# Patient Record
Sex: FEMALE | Race: OTHER | HISPANIC OR LATINO | Employment: UNEMPLOYED | URBAN - METROPOLITAN AREA
[De-identification: names, ages, dates, MRNs, and addresses within clinical notes are randomized per-mention and may not be internally consistent; named-entity substitution may affect disease eponyms.]

---

## 2023-05-31 ENCOUNTER — HOSPITAL ENCOUNTER (EMERGENCY)
Facility: HOSPITAL | Age: 28
Discharge: HOME | End: 2023-05-31
Attending: EMERGENCY MEDICINE

## 2023-05-31 VITALS
HEART RATE: 72 BPM | TEMPERATURE: 99 F | WEIGHT: 155.63 LBS | RESPIRATION RATE: 16 BRPM | DIASTOLIC BLOOD PRESSURE: 51 MMHG | SYSTOLIC BLOOD PRESSURE: 101 MMHG | OXYGEN SATURATION: 99 %

## 2023-05-31 DIAGNOSIS — O20.9 VAGINAL BLEEDING AFFECTING EARLY PREGNANCY: ICD-10-CM

## 2023-05-31 DIAGNOSIS — O36.80X0 PREGNANCY OF UNKNOWN ANATOMIC LOCATION: Primary | ICD-10-CM

## 2023-05-31 LAB
ANION GAP SERPL CALC-SCNC: 8 MMOL/L (ref 8–16)
B-HCG UR QL: POSITIVE
BASOPHILS # BLD AUTO: 0.02 K/UL (ref 0–0.2)
BASOPHILS NFR BLD: 0.3 % (ref 0–1.9)
BUN SERPL-MCNC: 10 MG/DL (ref 6–20)
CALCIUM SERPL-MCNC: 9.9 MG/DL (ref 8.7–10.5)
CHLORIDE SERPL-SCNC: 108 MMOL/L (ref 95–110)
CO2 SERPL-SCNC: 24 MMOL/L (ref 23–29)
CREAT SERPL-MCNC: 0.6 MG/DL (ref 0.5–1.4)
CTP QC/QA: YES
DIFFERENTIAL METHOD: NORMAL
EOSINOPHIL # BLD AUTO: 0.1 K/UL (ref 0–0.5)
EOSINOPHIL NFR BLD: 1 % (ref 0–8)
ERYTHROCYTE [DISTWIDTH] IN BLOOD BY AUTOMATED COUNT: 13 % (ref 11.5–14.5)
EST. GFR  (NO RACE VARIABLE): >60 ML/MIN/1.73 M^2
GLUCOSE SERPL-MCNC: 87 MG/DL (ref 70–110)
HCG INTACT+B SERPL-ACNC: 490 MIU/ML
HCT VFR BLD AUTO: 38 % (ref 37–48.5)
HCV AB SERPL QL IA: NORMAL
HGB BLD-MCNC: 12.5 G/DL (ref 12–16)
HIV 1+2 AB+HIV1 P24 AG SERPL QL IA: NORMAL
IMM GRANULOCYTES # BLD AUTO: 0.01 K/UL (ref 0–0.04)
IMM GRANULOCYTES NFR BLD AUTO: 0.2 % (ref 0–0.5)
LYMPHOCYTES # BLD AUTO: 2.5 K/UL (ref 1–4.8)
LYMPHOCYTES NFR BLD: 40.6 % (ref 18–48)
MCH RBC QN AUTO: 29.1 PG (ref 27–31)
MCHC RBC AUTO-ENTMCNC: 32.9 G/DL (ref 32–36)
MCV RBC AUTO: 88 FL (ref 82–98)
MONOCYTES # BLD AUTO: 0.5 K/UL (ref 0.3–1)
MONOCYTES NFR BLD: 8.5 % (ref 4–15)
NEUTROPHILS # BLD AUTO: 3.1 K/UL (ref 1.8–7.7)
NEUTROPHILS NFR BLD: 49.4 % (ref 38–73)
NRBC BLD-RTO: 0 /100 WBC
PLATELET # BLD AUTO: 240 K/UL (ref 150–450)
PMV BLD AUTO: 9.6 FL (ref 9.2–12.9)
POTASSIUM SERPL-SCNC: 3.9 MMOL/L (ref 3.5–5.1)
RBC # BLD AUTO: 4.3 M/UL (ref 4–5.4)
SODIUM SERPL-SCNC: 140 MMOL/L (ref 136–145)
WBC # BLD AUTO: 6.23 K/UL (ref 3.9–12.7)

## 2023-05-31 PROCEDURE — 81025 URINE PREGNANCY TEST: CPT | Performed by: PHYSICIAN ASSISTANT

## 2023-05-31 PROCEDURE — 99284 PR EMERGENCY DEPT VISIT,LEVEL IV: ICD-10-PCS | Mod: ,,, | Performed by: PHYSICIAN ASSISTANT

## 2023-05-31 PROCEDURE — 99284 EMERGENCY DEPT VISIT MOD MDM: CPT | Mod: ,,, | Performed by: PHYSICIAN ASSISTANT

## 2023-05-31 PROCEDURE — 86803 HEPATITIS C AB TEST: CPT | Performed by: PHYSICIAN ASSISTANT

## 2023-05-31 PROCEDURE — 87389 HIV-1 AG W/HIV-1&-2 AB AG IA: CPT | Performed by: PHYSICIAN ASSISTANT

## 2023-05-31 PROCEDURE — 80048 BASIC METABOLIC PNL TOTAL CA: CPT | Performed by: PHYSICIAN ASSISTANT

## 2023-05-31 PROCEDURE — 84702 CHORIONIC GONADOTROPIN TEST: CPT | Performed by: PHYSICIAN ASSISTANT

## 2023-05-31 PROCEDURE — 85025 COMPLETE CBC W/AUTO DIFF WBC: CPT | Performed by: PHYSICIAN ASSISTANT

## 2023-05-31 PROCEDURE — 99284 EMERGENCY DEPT VISIT MOD MDM: CPT | Mod: 25

## 2023-05-31 NOTE — ED TRIAGE NOTES
"Geovanni Sanjay Echavarria, a 28 y.o. female presents to the ED w/ complaint of possible pregnancy with spotting. LMP May 23 2023. Endorses lower abdm pain 5/10, "cramping like".     Triage note:  Chief Complaint   Patient presents with    Possible Pregnancy     Pt reports concern for possible pregancy. LMP May 23, 2023, states she took two pregnancy tests at home and they were positive, but now reports spotting.      Review of patient's allergies indicates:  No Known Allergies  No past medical history on file.    "

## 2023-05-31 NOTE — ED PROVIDER NOTES
Encounter Date: 5/31/2023       History     Chief Complaint   Patient presents with    Possible Pregnancy     Pt reports concern for possible pregancy. LMP May 23, 2023, states she took two pregnancy tests at home and they were positive, but now reports spotting.      28-year-old female from [place], German speaking only presents with vaginal bleeding and possible pregnancy.  No significant previous medical history, takes no prescription medications.  Has no known drug allergies.  Has 2 living children, both vaginal delivery.  Last menstrual cycle began on May 23rd and ended on May 26th.  Reports positive UPT at home.  Reports vaginal bleeding that began today with mild lower abdominal cramping.  No nausea, no vomiting.  No distress noted on exam.    Review of patient's allergies indicates:  No Known Allergies  History reviewed. No pertinent past medical history.  History reviewed. No pertinent surgical history.  History reviewed. No pertinent family history.  Social History     Tobacco Use    Smoking status: Never    Smokeless tobacco: Never   Substance Use Topics    Alcohol use: Not Currently    Drug use: Never     Review of Systems   Constitutional:  Negative for fever.   HENT:  Negative for sore throat.    Respiratory:  Negative for shortness of breath.    Cardiovascular:  Negative for chest pain.   Gastrointestinal:  Negative for nausea.   Genitourinary:  Positive for vaginal bleeding. Negative for dysuria.   Musculoskeletal:  Negative for back pain.   Skin:  Negative for rash.   Neurological:  Negative for weakness.   Hematological:  Does not bruise/bleed easily.     Physical Exam     Initial Vitals [05/31/23 0012]   BP Pulse Resp Temp SpO2   119/79 74 16 98.9 °F (37.2 °C) 100 %      MAP       --         Physical Exam    Constitutional: Vital signs are normal. She appears well-developed and well-nourished.   HENT:   Head: Normocephalic and atraumatic.   Right Ear: Hearing normal.   Left Ear: Hearing normal.    Eyes: Conjunctivae are normal.   Cardiovascular:  Normal rate and regular rhythm.           Abdominal: Abdomen is soft. Bowel sounds are normal.   Soft and benign abdomen   Musculoskeletal:         General: Normal range of motion.     Neurological: She is alert and oriented to person, place, and time.   Skin: Skin is warm and intact.   Psychiatric: She has a normal mood and affect. Her speech is normal and behavior is normal. Cognition and memory are normal.       ED Course   Procedures  Labs Reviewed   POCT URINE PREGNANCY - Abnormal; Notable for the following components:       Result Value    POC Preg Test, Ur Positive (*)     All other components within normal limits   HIV 1 / 2 ANTIBODY    Narrative:     Release to patient->Immediate   HEPATITIS C ANTIBODY    Narrative:     Release to patient->Immediate   CBC W/ AUTO DIFFERENTIAL    Narrative:     Release to patient->Immediate   HCG, QUANTITATIVE    Narrative:     Release to patient->Immediate   BASIC METABOLIC PANEL    Narrative:     Release to patient->Immediate          Imaging Results              US OB <14 Wks TransAbd & TransVag, Single Gestation (XPD) (Final result)  Result time 23 03:27:55   Procedure changed from US OB <14 Wks, TransAbd, Single Gestation     Final result by Sivakumar Baig MD (23 03:27:55)                   Impression:      No intrauterine pregnancy identified.  Thickened heterogeneous endometrium.  Findings consistent with pregnancy of unknown location and viability with differential including early pregnancy not yet visible, ectopic pregnancy and recent .  Clinical correlation, serial follow-up beta HCG and follow-up transvaginal ultrasound examination is recommended.    Retroverted uterus.    Prominent bilateral adnexal vessels, nonspecific.  Can be seen associated with pelvic congestion syndrome.    Electronically signed by resident: Arlene Perez  Date:    2023  Time:    03:13    Electronically  signed by: Sivakumar Baig MD  Date:    05/31/2023  Time:    03:27               Narrative:    EXAMINATION:  US OB <14 WEEKS, TRANSABDOM & TRANSVAG, SINGLE GESTATION (XPD)    CLINICAL HISTORY:  vaginal bleeding in early pregnancy; Hemorrhage in early pregnancy, unspecified    TECHNIQUE:  Transabdominal sonography of the pelvis was performed, followed by transvaginal sonography to better evaluate the uterus and ovaries.    COMPARISON:  None.    FINDINGS:  Intrauterine gestation(s): None    Mean gestational sac diameter: N/a    Yolk sac: N/a    Crown-rump length (CRL): N/a cm    Cardiac activity: None    Subchorionic hemorrhage: N/a    Uterus is retroverted and measures 7.2 x 3.7 x 5.3 cm.  Endometrium measures up to 16 mm.    Right ovary: Measures 2.7 x 2.2 x 1.7 cm with a possible corpus luteal cyst.    Left ovary: Measures 3.7 x 1.7 x 1.3 cm.  Unremarkable appearance.    Miscellaneous: No Free Fluid.  Prominent bilateral adnexal vessels.                                       Medications - No data to display  Medical Decision Making:   History:   Old Medical Records: I decided to obtain old medical records.  Old Records Summarized: records from clinic visits.  Initial Assessment:   28-year-old female with vaginal spotting and recent positive UPT  Differential Diagnosis:   Pregnancy of unknown viability or location, 1st trimester bleeding, implantation bleeding, miscarriage, menstrual cycle  Clinical Tests:   Lab Tests: Ordered and Reviewed  Radiological Study: Ordered and Reviewed  Medical Tests: Ordered and Reviewed  ED Management:  Plan:   Reassuring physical exam.  Beta hCG 400.  UPT positive.  Suspect this is likely very early pregnancy, possible implantation bleeding, ultrasound without any significant findings.  There was specifically no evidence of an ectopic pregnancy.  Though she is so early this is pregnancy of unknown location or viability.  She was advised to start taking prenatal pills and follow-up  with obstetrician, return precautions were given.                        Clinical Impression:   Final diagnoses:  [O20.9] Vaginal bleeding affecting early pregnancy  [O36.80X0] Pregnancy of unknown anatomic location (Primary)        ED Disposition Condition    Discharge Stable          ED Prescriptions    None       Follow-up Information    None          Michael Blakely PA-C  05/31/23 0334

## 2023-05-31 NOTE — DISCHARGE INSTRUCTIONS
Start taking pre  vitamins, followup with OB  Return to the ED as needed    Thank you for coming to our Emergency Department today. It is important to remember that some problems are difficult to diagnose and may not be found during your Emergency Department visit. Be sure to follow up with your primary care doctor and review all labs/imaging/tests that were performed during this visit with them. Some labs/tests may be outside of the normal range and require non-emergent follow-up and further investigation to help diagnose/exclude/prevent complications or other medical conditions.    If you do not have a primary care doctor, you may contact the one listed on your discharge paperwork or you may also call the [facility] Appointment Desk at [phone] to schedule an appointment and establish care with one. It is important to your health that you have a primary care doctor.    Please take all medications as directed. All medications may potentially have side-effects and it is impossible to predict which medications may give you side-effects or what side-effects (if any) they will give you.. If you feel that you are having a negative effect or side-effect of any medication you should immediately stop taking them and seek medical attention. If you feel that you are having a life-threatening reaction call 911.    Return to the ER with any questions/concerns, new/concerning symptoms, worsening or failure to improve.     Do not drive, swim, climb to height, take a bath or make any important decisions for 24 hours if you have received any pain medications, sedatives or mood altering drugs during your ER visit.

## 2025-05-14 ENCOUNTER — RESULTS FOLLOW-UP (OUTPATIENT)
Dept: OBSTETRICS AND GYNECOLOGY | Facility: CLINIC | Age: 30
End: 2025-05-14

## 2025-06-02 ENCOUNTER — RESULTS FOLLOW-UP (OUTPATIENT)
Dept: OBSTETRICS AND GYNECOLOGY | Facility: CLINIC | Age: 30
End: 2025-06-02

## 2025-06-24 ENCOUNTER — ROUTINE PRENATAL (OUTPATIENT)
Dept: OBSTETRICS AND GYNECOLOGY | Facility: CLINIC | Age: 30
End: 2025-06-24
Payer: MEDICAID

## 2025-06-24 ENCOUNTER — PROCEDURE VISIT (OUTPATIENT)
Dept: MATERNAL FETAL MEDICINE | Facility: CLINIC | Age: 30
End: 2025-06-24
Payer: MEDICAID

## 2025-06-24 ENCOUNTER — LAB VISIT (OUTPATIENT)
Dept: LAB | Facility: HOSPITAL | Age: 30
End: 2025-06-24
Attending: STUDENT IN AN ORGANIZED HEALTH CARE EDUCATION/TRAINING PROGRAM
Payer: MEDICAID

## 2025-06-24 VITALS
BODY MASS INDEX: 24.66 KG/M2 | SYSTOLIC BLOOD PRESSURE: 122 MMHG | HEART RATE: 73 BPM | DIASTOLIC BLOOD PRESSURE: 72 MMHG | WEIGHT: 152.75 LBS

## 2025-06-24 DIAGNOSIS — Z30.09 UNWANTED FERTILITY: Primary | ICD-10-CM

## 2025-06-24 DIAGNOSIS — Z34.83 ENCOUNTER FOR SUPERVISION OF OTHER NORMAL PREGNANCY IN THIRD TRIMESTER: ICD-10-CM

## 2025-06-24 LAB
ABSOLUTE EOSINOPHIL (OHS): 0.03 K/UL
ABSOLUTE MONOCYTE (OHS): 0.43 K/UL (ref 0.3–1)
ABSOLUTE NEUTROPHIL COUNT (OHS): 3.61 K/UL (ref 1.8–7.7)
BASOPHILS # BLD AUTO: 0.03 K/UL
BASOPHILS NFR BLD AUTO: 0.5 %
ERYTHROCYTE [DISTWIDTH] IN BLOOD BY AUTOMATED COUNT: 14.9 % (ref 11.5–14.5)
HCT VFR BLD AUTO: 36.7 % (ref 37–48.5)
HGB BLD-MCNC: 12 GM/DL (ref 12–16)
HIV 1+2 AB+HIV1 P24 AG SERPL QL IA: NORMAL
IMM GRANULOCYTES # BLD AUTO: 0.02 K/UL (ref 0–0.04)
IMM GRANULOCYTES NFR BLD AUTO: 0.3 % (ref 0–0.5)
LYMPHOCYTES # BLD AUTO: 1.92 K/UL (ref 1–4.8)
MCH RBC QN AUTO: 28.6 PG (ref 27–31)
MCHC RBC AUTO-ENTMCNC: 32.7 G/DL (ref 32–36)
MCV RBC AUTO: 87 FL (ref 82–98)
NUCLEATED RBC (/100WBC) (OHS): 0 /100 WBC
PLATELET # BLD AUTO: 198 K/UL (ref 150–450)
PMV BLD AUTO: 10.7 FL (ref 9.2–12.9)
RBC # BLD AUTO: 4.2 M/UL (ref 4–5.4)
RELATIVE EOSINOPHIL (OHS): 0.5 %
RELATIVE LYMPHOCYTE (OHS): 31.8 % (ref 18–48)
RELATIVE MONOCYTE (OHS): 7.1 % (ref 4–15)
RELATIVE NEUTROPHIL (OHS): 59.8 % (ref 38–73)
T PALLIDUM IGG+IGM SER QL: NORMAL
WBC # BLD AUTO: 6.04 K/UL (ref 3.9–12.7)

## 2025-06-24 PROCEDURE — 76816 OB US FOLLOW-UP PER FETUS: CPT | Mod: PBBFAC,PO | Performed by: STUDENT IN AN ORGANIZED HEALTH CARE EDUCATION/TRAINING PROGRAM

## 2025-06-24 PROCEDURE — 86593 SYPHILIS TEST NON-TREP QUANT: CPT

## 2025-06-24 PROCEDURE — 99999 PR PBB SHADOW E&M-EST. PATIENT-LVL II: CPT | Mod: PBBFAC,,, | Performed by: STUDENT IN AN ORGANIZED HEALTH CARE EDUCATION/TRAINING PROGRAM

## 2025-06-24 PROCEDURE — 85025 COMPLETE CBC W/AUTO DIFF WBC: CPT

## 2025-06-24 PROCEDURE — 87389 HIV-1 AG W/HIV-1&-2 AB AG IA: CPT

## 2025-06-24 PROCEDURE — 87081 CULTURE SCREEN ONLY: CPT | Performed by: STUDENT IN AN ORGANIZED HEALTH CARE EDUCATION/TRAINING PROGRAM

## 2025-06-24 PROCEDURE — 99212 OFFICE O/P EST SF 10 MIN: CPT | Mod: PBBFAC,TH,PO,25 | Performed by: STUDENT IN AN ORGANIZED HEALTH CARE EDUCATION/TRAINING PROGRAM

## 2025-06-24 PROCEDURE — 36415 COLL VENOUS BLD VENIPUNCTURE: CPT

## 2025-06-24 NOTE — PROGRESS NOTES
Chief Complaint   Patient presents with    Routine Prenatal Visit       30 y.o., at 36w4d by Estimated Date of Delivery: 25    Complaints today: Patient doing well. No signs of labor. No complaints.     ROS  OBSTETRICS:   Contractions: n   Bleeding: n   Loss of fluid: n   Fetal movement: y  GASTRO:   Nausea: n   Vomiting: n      OB History    Para Term  AB Living   4 3 3   2   SAB IAB Ectopic Multiple Live Births      0 3      # Outcome Date GA Lbr Lasha/2nd Weight Sex Type Anes PTL Lv   4 Current            3 Term 24 41w1d  3.21 kg (7 lb 1.2 oz) F Vag-Spont Spinal, EPI N NINA   2 Term 18 37w1d 07:29 / 02:10 2.572 kg (5 lb 10.7 oz) F Vag-Spont EPI N NINA      Complications: Cholestasis   1 Term      Vag-Spont   ND       Dating reviewed  Allergies and problem list reviewed and updated  Medical and surgical history reviewed  Prenatal labs reviewed and updated    PHYSICAL EXAM  /72   Pulse 73   Wt 69.3 kg (152 lb 12.5 oz)   LMP 10/01/2024   BMI 24.66 kg/m²     GENERAL: No acute distress  NEURO: Alert and oriented x3  PSYCH: Normal mood and affect  PULMONARY: Non-labored respiration  ABDomen: Soft, gravid, nontender    ASSESSMENT AND PLAN     Problems (from 25 to present)       Problem Noted Diagnosed Resolved    Encounter for supervision of normal pregnancy in third trimester 2025 by Kendal Conteh MD  No            GBS/3rds today  NML Growth today     labor precautions given  Follow-up: 1 week

## 2025-06-27 LAB — BACTERIA SPEC AEROBE CULT: NORMAL

## 2025-07-01 ENCOUNTER — ROUTINE PRENATAL (OUTPATIENT)
Dept: OBSTETRICS AND GYNECOLOGY | Facility: CLINIC | Age: 30
End: 2025-07-01
Payer: MEDICAID

## 2025-07-01 VITALS
BODY MASS INDEX: 24.91 KG/M2 | DIASTOLIC BLOOD PRESSURE: 55 MMHG | SYSTOLIC BLOOD PRESSURE: 113 MMHG | WEIGHT: 154.31 LBS | HEART RATE: 68 BPM

## 2025-07-01 DIAGNOSIS — Z30.09 UNWANTED FERTILITY: ICD-10-CM

## 2025-07-01 DIAGNOSIS — Z34.83 ENCOUNTER FOR SUPERVISION OF OTHER NORMAL PREGNANCY IN THIRD TRIMESTER: ICD-10-CM

## 2025-07-01 DIAGNOSIS — Z3A.37 37 WEEKS GESTATION OF PREGNANCY: Primary | ICD-10-CM

## 2025-07-01 LAB
BILIRUB SERPL-MCNC: ABNORMAL MG/DL
BLOOD URINE, POC: ABNORMAL
CLARITY, POC UA: CLEAR
COLOR, POC UA: YELLOW
GLUCOSE UR QL STRIP: ABNORMAL
KETONES UR QL STRIP: ABNORMAL
LEUKOCYTE ESTERASE URINE, POC: ABNORMAL
NITRITE, POC UA: ABNORMAL
PH, POC UA: 6.5
PROTEIN, POC: 100
SPECIFIC GRAVITY, POC UA: >=1.03
UROBILINOGEN, POC UA: 1

## 2025-07-01 PROCEDURE — 99999PBSHW POCT URINE DIPSTICK WITHOUT MICROSCOPE: Mod: PBBFAC,,,

## 2025-07-01 PROCEDURE — 99999 PR PBB SHADOW E&M-EST. PATIENT-LVL II: CPT | Mod: PBBFAC,,, | Performed by: STUDENT IN AN ORGANIZED HEALTH CARE EDUCATION/TRAINING PROGRAM

## 2025-07-01 PROCEDURE — 99214 OFFICE O/P EST MOD 30 MIN: CPT | Mod: TH,S$PBB,, | Performed by: STUDENT IN AN ORGANIZED HEALTH CARE EDUCATION/TRAINING PROGRAM

## 2025-07-01 PROCEDURE — 99212 OFFICE O/P EST SF 10 MIN: CPT | Mod: PBBFAC,TH,PO | Performed by: STUDENT IN AN ORGANIZED HEALTH CARE EDUCATION/TRAINING PROGRAM

## 2025-07-01 PROCEDURE — 81002 URINALYSIS NONAUTO W/O SCOPE: CPT | Mod: PBBFAC,PO | Performed by: STUDENT IN AN ORGANIZED HEALTH CARE EDUCATION/TRAINING PROGRAM

## 2025-07-01 RX ORDER — ONDANSETRON 4 MG/1
TABLET, ORALLY DISINTEGRATING ORAL
COMMUNITY

## 2025-07-01 RX ORDER — FAMOTIDINE 20 MG/1
1 TABLET, FILM COATED ORAL 2 TIMES DAILY
COMMUNITY

## 2025-07-01 NOTE — PROGRESS NOTES
Chief Complaint   Patient presents with    Routine Prenatal Visit       30 y.o., at 37w4d by Estimated Date of Delivery: 25    Complaints today: Patient doing well. No signs of labor.     ROS  OBSTETRICS:   Contractions: n   Bleeding: n   Loss of fluid: n   Fetal movement: y  GASTRO:   Nausea: n   Vomiting: n      OB History    Para Term  AB Living   4 3 3 0 0 2   SAB IAB Ectopic Multiple Live Births   0 0 0  3      # Outcome Date GA Lbr Lasha/2nd Weight Sex Type Anes PTL Lv   4 Current            3 Term 24 41w1d  3.21 kg (7 lb 1.2 oz) F Vag-Spont Spinal, EPI N NINA   2 Term 18 37w1d 07:29 / 02:10 2.572 kg (5 lb 10.7 oz) F Vag-Spont EPI N NINA      Complications: Cholestasis   1 Term      Vag-Spont   ND       Dating reviewed  Allergies and problem list reviewed and updated  Medical and surgical history reviewed  Prenatal labs reviewed and updated    PHYSICAL EXAM  BP (!) 113/55   Pulse 68   Wt 70 kg (154 lb 5.2 oz)   LMP 10/01/2024   BMI 24.91 kg/m²     GENERAL: No acute distress  NEURO: Alert and oriented x3  PSYCH: Normal mood and affect  PULMONARY: Non-labored respiration  ABDomen: Soft, gravid, nontender    ASSESSMENT AND PLAN     Problems (from 25 to present)       Problem Noted Diagnosed Resolved    Encounter for supervision of normal pregnancy in third trimester 2025 by Kendal Conteh MD  No            1/50/-4, Baby high  Expectant management    Labor precautions given  Follow-up: 1 week with Dr. Montoya

## 2025-07-08 ENCOUNTER — LAB VISIT (OUTPATIENT)
Dept: LAB | Facility: HOSPITAL | Age: 30
End: 2025-07-08
Attending: OBSTETRICS & GYNECOLOGY
Payer: MEDICAID

## 2025-07-08 ENCOUNTER — ROUTINE PRENATAL (OUTPATIENT)
Dept: OBSTETRICS AND GYNECOLOGY | Facility: CLINIC | Age: 30
End: 2025-07-08
Payer: MEDICAID

## 2025-07-08 VITALS
BODY MASS INDEX: 24.87 KG/M2 | WEIGHT: 154.13 LBS | DIASTOLIC BLOOD PRESSURE: 77 MMHG | SYSTOLIC BLOOD PRESSURE: 116 MMHG

## 2025-07-08 DIAGNOSIS — L29.9 ITCHING: ICD-10-CM

## 2025-07-08 DIAGNOSIS — Z3A.38 38 WEEKS GESTATION OF PREGNANCY: ICD-10-CM

## 2025-07-08 DIAGNOSIS — Z3A.38 38 WEEKS GESTATION OF PREGNANCY: Primary | ICD-10-CM

## 2025-07-08 LAB
ALBUMIN SERPL BCP-MCNC: 2.9 G/DL (ref 3.5–5.2)
ALP SERPL-CCNC: 245 UNIT/L (ref 40–150)
ALT SERPL W/O P-5'-P-CCNC: 92 UNIT/L (ref 10–44)
ANION GAP (OHS): 11 MMOL/L (ref 8–16)
AST SERPL-CCNC: 45 UNIT/L (ref 11–45)
BILIRUB SERPL-MCNC: 0.4 MG/DL (ref 0.1–1)
BILIRUB SERPL-MCNC: NORMAL MG/DL
BLOOD URINE, POC: NORMAL
BUN SERPL-MCNC: 7 MG/DL (ref 6–20)
CALCIUM SERPL-MCNC: 9.4 MG/DL (ref 8.7–10.5)
CHLORIDE SERPL-SCNC: 106 MMOL/L (ref 95–110)
CLARITY, POC UA: CLEAR
CO2 SERPL-SCNC: 18 MMOL/L (ref 23–29)
COLOR, POC UA: YELLOW
CREAT SERPL-MCNC: 0.7 MG/DL (ref 0.5–1.4)
GFR SERPLBLD CREATININE-BSD FMLA CKD-EPI: >60 ML/MIN/1.73/M2
GLUCOSE SERPL-MCNC: 65 MG/DL (ref 70–110)
GLUCOSE UR QL STRIP: NORMAL
KETONES UR QL STRIP: NORMAL
LEUKOCYTE ESTERASE URINE, POC: NORMAL
NITRITE, POC UA: NORMAL
PH, POC UA: 7
POTASSIUM SERPL-SCNC: 3.8 MMOL/L (ref 3.5–5.1)
PROT SERPL-MCNC: 7.3 GM/DL (ref 6–8.4)
PROTEIN, POC: 30
SODIUM SERPL-SCNC: 135 MMOL/L (ref 136–145)
SPECIFIC GRAVITY, POC UA: 1.02
UROBILINOGEN, POC UA: 1

## 2025-07-08 PROCEDURE — 99999 PR PBB SHADOW E&M-EST. PATIENT-LVL II: CPT | Mod: PBBFAC,,, | Performed by: OBSTETRICS & GYNECOLOGY

## 2025-07-08 PROCEDURE — 82040 ASSAY OF SERUM ALBUMIN: CPT

## 2025-07-08 PROCEDURE — 82239 BILE ACIDS TOTAL: CPT

## 2025-07-08 PROCEDURE — 81002 URINALYSIS NONAUTO W/O SCOPE: CPT | Mod: PBBFAC,PO | Performed by: OBSTETRICS & GYNECOLOGY

## 2025-07-08 PROCEDURE — 99212 OFFICE O/P EST SF 10 MIN: CPT | Mod: PBBFAC,PO | Performed by: OBSTETRICS & GYNECOLOGY

## 2025-07-08 PROCEDURE — 99999PBSHW POCT URINE DIPSTICK WITHOUT MICROSCOPE: Mod: PBBFAC,,,

## 2025-07-08 PROCEDURE — 36415 COLL VENOUS BLD VENIPUNCTURE: CPT

## 2025-07-08 NOTE — PROGRESS NOTES
31 yo  female @ 38.4 wks (EDC 2025) who presents for OB care. Dr. Conteh's patient.  Reports that she has been itching all over for a few days. Reports h/o cholestasis with previous pregnancy.  Reports great fetal movement. No lof. Min ctxs. No vb.  Cervix 3cm today, Vertex, Bile acids ordered, NST for later this week while bile acids results return.    F/u with dr. Conteh in 1 wk as scheduled.  Given labor precautions    JASON mac MD

## 2025-07-10 ENCOUNTER — HOSPITAL ENCOUNTER (OUTPATIENT)
Dept: OBSTETRICS AND GYNECOLOGY | Facility: HOSPITAL | Age: 30
Discharge: HOME | End: 2025-07-10
Attending: OBSTETRICS & GYNECOLOGY
Payer: MEDICAID

## 2025-07-10 VITALS
OXYGEN SATURATION: 98 % | DIASTOLIC BLOOD PRESSURE: 62 MMHG | HEART RATE: 76 BPM | RESPIRATION RATE: 16 BRPM | SYSTOLIC BLOOD PRESSURE: 114 MMHG

## 2025-07-10 DIAGNOSIS — O26.643 CHOLESTASIS DURING PREGNANCY IN THIRD TRIMESTER: ICD-10-CM

## 2025-07-10 DIAGNOSIS — K83.1 CHOLESTASIS: Primary | ICD-10-CM

## 2025-07-10 LAB — BILE AC SERPL-SCNC: 5 MCMOL/L

## 2025-07-10 PROCEDURE — 59025 FETAL NON-STRESS TEST: CPT

## 2025-07-15 ENCOUNTER — LAB VISIT (OUTPATIENT)
Dept: LAB | Facility: HOSPITAL | Age: 30
End: 2025-07-15
Attending: STUDENT IN AN ORGANIZED HEALTH CARE EDUCATION/TRAINING PROGRAM
Payer: MEDICAID

## 2025-07-15 ENCOUNTER — ROUTINE PRENATAL (OUTPATIENT)
Dept: OBSTETRICS AND GYNECOLOGY | Facility: CLINIC | Age: 30
End: 2025-07-15
Payer: MEDICAID

## 2025-07-15 VITALS
DIASTOLIC BLOOD PRESSURE: 76 MMHG | HEART RATE: 76 BPM | BODY MASS INDEX: 24.98 KG/M2 | WEIGHT: 154.75 LBS | SYSTOLIC BLOOD PRESSURE: 121 MMHG

## 2025-07-15 DIAGNOSIS — Z30.09 UNWANTED FERTILITY: Primary | ICD-10-CM

## 2025-07-15 DIAGNOSIS — Z34.83 ENCOUNTER FOR SUPERVISION OF OTHER NORMAL PREGNANCY IN THIRD TRIMESTER: ICD-10-CM

## 2025-07-15 LAB
ALBUMIN SERPL BCP-MCNC: 2.9 G/DL (ref 3.5–5.2)
ALP SERPL-CCNC: 252 UNIT/L (ref 40–150)
ALT SERPL W/O P-5'-P-CCNC: 58 UNIT/L (ref 10–44)
ANION GAP (OHS): 9 MMOL/L (ref 8–16)
AST SERPL-CCNC: 38 UNIT/L (ref 11–45)
BILIRUB SERPL-MCNC: 0.4 MG/DL (ref 0.1–1)
BUN SERPL-MCNC: 10 MG/DL (ref 6–20)
CALCIUM SERPL-MCNC: 9.5 MG/DL (ref 8.7–10.5)
CHLORIDE SERPL-SCNC: 106 MMOL/L (ref 95–110)
CO2 SERPL-SCNC: 19 MMOL/L (ref 23–29)
CREAT SERPL-MCNC: 0.7 MG/DL (ref 0.5–1.4)
GFR SERPLBLD CREATININE-BSD FMLA CKD-EPI: >60 ML/MIN/1.73/M2
GLUCOSE SERPL-MCNC: 68 MG/DL (ref 70–110)
POTASSIUM SERPL-SCNC: 4 MMOL/L (ref 3.5–5.1)
PROT SERPL-MCNC: 7.3 GM/DL (ref 6–8.4)
SODIUM SERPL-SCNC: 134 MMOL/L (ref 136–145)

## 2025-07-15 PROCEDURE — 82040 ASSAY OF SERUM ALBUMIN: CPT

## 2025-07-15 PROCEDURE — 99212 OFFICE O/P EST SF 10 MIN: CPT | Mod: PBBFAC,TH,PO | Performed by: STUDENT IN AN ORGANIZED HEALTH CARE EDUCATION/TRAINING PROGRAM

## 2025-07-15 PROCEDURE — 99999 PR PBB SHADOW E&M-EST. PATIENT-LVL II: CPT | Mod: PBBFAC,,, | Performed by: STUDENT IN AN ORGANIZED HEALTH CARE EDUCATION/TRAINING PROGRAM

## 2025-07-15 PROCEDURE — 82239 BILE ACIDS TOTAL: CPT

## 2025-07-15 PROCEDURE — 36415 COLL VENOUS BLD VENIPUNCTURE: CPT

## 2025-07-15 NOTE — PROGRESS NOTES
Chief Complaint   Patient presents with    Routine Prenatal Visit       30 y.o., at 39w4d by Estimated Date of Delivery: 25    Complaints today: Patient doing well. Feeling active fetal movements. Still having some generalized itching.     ROS  OBSTETRICS:   Contractions: n   Bleeding: n   Loss of fluid: n   Fetal movement: y  GASTRO:   Nausea: n   Vomiting: n      OB History    Para Term  AB Living   4 3 3 0 0 2   SAB IAB Ectopic Multiple Live Births   0 0 0  3      # Outcome Date GA Lbr Lasha/2nd Weight Sex Type Anes PTL Lv   4 Current            3 Term 24 41w1d  3.21 kg (7 lb 1.2 oz) F Vag-Spont Spinal, EPI N NINA   2 Term 18 37w1d 07:29 / 02:10 2.572 kg (5 lb 10.7 oz) F Vag-Spont EPI N NINA      Complications: Cholestasis   1 Term      Vag-Spont   ND       Dating reviewed  Allergies and problem list reviewed and updated  Medical and surgical history reviewed  Prenatal labs reviewed and updated    PHYSICAL EXAM  /76   Pulse 76   Wt 70.2 kg (154 lb 12.2 oz)   LMP 10/01/2024   BMI 24.98 kg/m²     GENERAL: No acute distress  NEURO: Alert and oriented x3  PSYCH: Normal mood and affect  PULMONARY: Non-labored respiration  ABDomen: Soft, gravid, nontender    ASSESSMENT AND PLAN     Problems (from 25 to present)       Problem Noted Diagnosed Resolved    Encounter for supervision of normal pregnancy in third trimester 2025 by Kendal Conteh MD  No            Will get Follow-Up Bile Acids and LFTS  Has IOL scheduled     labor precautions given  Follow-up: 1 week

## 2025-07-16 ENCOUNTER — ANESTHESIA (OUTPATIENT)
Dept: OBSTETRICS AND GYNECOLOGY | Facility: HOSPITAL | Age: 30
End: 2025-07-16
Payer: MEDICAID

## 2025-07-16 ENCOUNTER — ANESTHESIA EVENT (OUTPATIENT)
Dept: OBSTETRICS AND GYNECOLOGY | Facility: HOSPITAL | Age: 30
End: 2025-07-16
Payer: MEDICAID

## 2025-07-16 ENCOUNTER — HOSPITAL ENCOUNTER (INPATIENT)
Facility: HOSPITAL | Age: 30
LOS: 2 days | Discharge: HOME | End: 2025-07-18
Attending: STUDENT IN AN ORGANIZED HEALTH CARE EDUCATION/TRAINING PROGRAM | Admitting: OBSTETRICS & GYNECOLOGY
Payer: MEDICAID

## 2025-07-16 DIAGNOSIS — Z30.2 ENCOUNTER FOR TUBAL LIGATION: ICD-10-CM

## 2025-07-16 DIAGNOSIS — Z3A.39 39 WEEKS GESTATION OF PREGNANCY: ICD-10-CM

## 2025-07-16 LAB
ABSOLUTE EOSINOPHIL (OHS): 0.02 K/UL
ABSOLUTE MONOCYTE (OHS): 0.55 K/UL (ref 0.3–1)
ABSOLUTE NEUTROPHIL COUNT (OHS): 3.9 K/UL (ref 1.8–7.7)
ALBUMIN SERPL BCP-MCNC: 2.5 G/DL (ref 3.5–5.2)
ALP SERPL-CCNC: 214 UNIT/L (ref 40–150)
ALT SERPL W/O P-5'-P-CCNC: 48 UNIT/L (ref 10–44)
ANION GAP (OHS): 7 MMOL/L (ref 8–16)
AST SERPL-CCNC: 36 UNIT/L (ref 11–45)
BASOPHILS # BLD AUTO: 0.02 K/UL
BASOPHILS NFR BLD AUTO: 0.3 %
BILIRUB SERPL-MCNC: 0.2 MG/DL (ref 0.1–1)
BUN SERPL-MCNC: 10 MG/DL (ref 6–20)
CALCIUM SERPL-MCNC: 8.6 MG/DL (ref 8.7–10.5)
CHLORIDE SERPL-SCNC: 109 MMOL/L (ref 95–110)
CO2 SERPL-SCNC: 20 MMOL/L (ref 23–29)
CREAT SERPL-MCNC: 0.7 MG/DL (ref 0.5–1.4)
ERYTHROCYTE [DISTWIDTH] IN BLOOD BY AUTOMATED COUNT: 14.7 % (ref 11.5–14.5)
GFR SERPLBLD CREATININE-BSD FMLA CKD-EPI: >60 ML/MIN/1.73/M2
GLUCOSE SERPL-MCNC: 96 MG/DL (ref 70–110)
GROUP & RH: NORMAL
HCT VFR BLD AUTO: 31.8 % (ref 37–48.5)
HGB BLD-MCNC: 10.8 GM/DL (ref 12–16)
IMM GRANULOCYTES # BLD AUTO: 0.02 K/UL (ref 0–0.04)
IMM GRANULOCYTES NFR BLD AUTO: 0.3 % (ref 0–0.5)
INDIRECT COOMBS: NORMAL
LYMPHOCYTES # BLD AUTO: 2.41 K/UL (ref 1–4.8)
MCH RBC QN AUTO: 29.4 PG (ref 27–31)
MCHC RBC AUTO-ENTMCNC: 34 G/DL (ref 32–36)
MCV RBC AUTO: 87 FL (ref 82–98)
NUCLEATED RBC (/100WBC) (OHS): 0 /100 WBC
PLATELET # BLD AUTO: 161 K/UL (ref 150–450)
PMV BLD AUTO: 10.8 FL (ref 9.2–12.9)
POTASSIUM SERPL-SCNC: 4 MMOL/L (ref 3.5–5.1)
PROT SERPL-MCNC: 6.2 GM/DL (ref 6–8.4)
RBC # BLD AUTO: 3.67 M/UL (ref 4–5.4)
RELATIVE EOSINOPHIL (OHS): 0.3 %
RELATIVE LYMPHOCYTE (OHS): 34.8 % (ref 18–48)
RELATIVE MONOCYTE (OHS): 7.9 % (ref 4–15)
RELATIVE NEUTROPHIL (OHS): 56.4 % (ref 38–73)
SODIUM SERPL-SCNC: 136 MMOL/L (ref 136–145)
T PALLIDUM IGG+IGM SER QL: NORMAL
WBC # BLD AUTO: 6.92 K/UL (ref 3.9–12.7)

## 2025-07-16 PROCEDURE — 37000009 HC ANESTHESIA EA ADD 15 MINS: Performed by: STUDENT IN AN ORGANIZED HEALTH CARE EDUCATION/TRAINING PROGRAM

## 2025-07-16 PROCEDURE — 72100002 HC LABOR CARE, 1ST 8 HOURS

## 2025-07-16 PROCEDURE — 72200004 HC VAGINAL DELIVERY LEVEL I

## 2025-07-16 PROCEDURE — 86593 SYPHILIS TEST NON-TREP QUANT: CPT | Performed by: OBSTETRICS & GYNECOLOGY

## 2025-07-16 PROCEDURE — 11000001 HC ACUTE MED/SURG PRIVATE ROOM

## 2025-07-16 PROCEDURE — 27200710 HC EPIDURAL INFUSION PUMP SET: Performed by: ANESTHESIOLOGY

## 2025-07-16 PROCEDURE — 71000033 HC RECOVERY, INTIAL HOUR: Mod: SZN | Performed by: STUDENT IN AN ORGANIZED HEALTH CARE EDUCATION/TRAINING PROGRAM

## 2025-07-16 PROCEDURE — 25000003 PHARM REV CODE 250: Performed by: OBSTETRICS & GYNECOLOGY

## 2025-07-16 PROCEDURE — 62326 NJX INTERLAMINAR LMBR/SAC: CPT | Performed by: NURSE ANESTHETIST, CERTIFIED REGISTERED

## 2025-07-16 PROCEDURE — 37000008 HC ANESTHESIA 1ST 15 MINUTES: Performed by: STUDENT IN AN ORGANIZED HEALTH CARE EDUCATION/TRAINING PROGRAM

## 2025-07-16 PROCEDURE — 25000003 PHARM REV CODE 250: Performed by: NURSE ANESTHETIST, CERTIFIED REGISTERED

## 2025-07-16 PROCEDURE — 36004722: Performed by: STUDENT IN AN ORGANIZED HEALTH CARE EDUCATION/TRAINING PROGRAM

## 2025-07-16 PROCEDURE — 36004723: Performed by: STUDENT IN AN ORGANIZED HEALTH CARE EDUCATION/TRAINING PROGRAM

## 2025-07-16 PROCEDURE — 36415 COLL VENOUS BLD VENIPUNCTURE: CPT | Performed by: OBSTETRICS & GYNECOLOGY

## 2025-07-16 PROCEDURE — 85025 COMPLETE CBC W/AUTO DIFF WBC: CPT | Performed by: OBSTETRICS & GYNECOLOGY

## 2025-07-16 PROCEDURE — 0UB70ZZ EXCISION OF BILATERAL FALLOPIAN TUBES, OPEN APPROACH: ICD-10-PCS | Performed by: OBSTETRICS & GYNECOLOGY

## 2025-07-16 PROCEDURE — C1751 CATH, INF, PER/CENT/MIDLINE: HCPCS | Performed by: ANESTHESIOLOGY

## 2025-07-16 PROCEDURE — 59409 OBSTETRICAL CARE: CPT | Mod: GB,,, | Performed by: OBSTETRICS & GYNECOLOGY

## 2025-07-16 PROCEDURE — 63600175 PHARM REV CODE 636 W HCPCS: Performed by: NURSE ANESTHETIST, CERTIFIED REGISTERED

## 2025-07-16 PROCEDURE — 99900035 HC TECH TIME PER 15 MIN (STAT)

## 2025-07-16 PROCEDURE — 80053 COMPREHEN METABOLIC PANEL: CPT | Performed by: OBSTETRICS & GYNECOLOGY

## 2025-07-16 PROCEDURE — 58605 DIVISION OF FALLOPIAN TUBE: CPT | Mod: 51,,, | Performed by: STUDENT IN AN ORGANIZED HEALTH CARE EDUCATION/TRAINING PROGRAM

## 2025-07-16 PROCEDURE — 51702 INSERT TEMP BLADDER CATH: CPT

## 2025-07-16 PROCEDURE — 86901 BLOOD TYPING SEROLOGIC RH(D): CPT | Performed by: OBSTETRICS & GYNECOLOGY

## 2025-07-16 PROCEDURE — 63600175 PHARM REV CODE 636 W HCPCS: Performed by: OBSTETRICS & GYNECOLOGY

## 2025-07-16 RX ORDER — ONDANSETRON 8 MG/1
8 TABLET, ORALLY DISINTEGRATING ORAL EVERY 8 HOURS PRN
Status: DISCONTINUED | OUTPATIENT
Start: 2025-07-16 | End: 2025-07-18 | Stop reason: HOSPADM

## 2025-07-16 RX ORDER — OXYTOCIN-SODIUM CHLORIDE 0.9% IV SOLN 30 UNIT/500ML 30-0.9/5 UT/ML-%
95 SOLUTION INTRAVENOUS CONTINUOUS PRN
Status: DISCONTINUED | OUTPATIENT
Start: 2025-07-16 | End: 2025-07-18 | Stop reason: HOSPADM

## 2025-07-16 RX ORDER — ACETAMINOPHEN 500 MG
1000 TABLET ORAL EVERY 6 HOURS PRN
Status: DISCONTINUED | OUTPATIENT
Start: 2025-07-16 | End: 2025-07-18 | Stop reason: HOSPADM

## 2025-07-16 RX ORDER — CARBOPROST TROMETHAMINE 250 UG/ML
INJECTION, SOLUTION INTRAMUSCULAR
Status: DISCONTINUED
Start: 2025-07-16 | End: 2025-07-16 | Stop reason: WASHOUT

## 2025-07-16 RX ORDER — OXYTOCIN-SODIUM CHLORIDE 0.9% IV SOLN 30 UNIT/500ML 30-0.9/5 UT/ML-%
10 SOLUTION INTRAVENOUS ONCE AS NEEDED
Status: DISCONTINUED | OUTPATIENT
Start: 2025-07-16 | End: 2025-07-16

## 2025-07-16 RX ORDER — FENTANYL/BUPIVACAINE/NS/PF 2MCG/ML-.1
PLASTIC BAG, INJECTION (ML) INJECTION
Status: COMPLETED
Start: 2025-07-16 | End: 2025-07-16

## 2025-07-16 RX ORDER — IBUPROFEN 600 MG/1
600 TABLET, FILM COATED ORAL EVERY 6 HOURS
Status: DISCONTINUED | OUTPATIENT
Start: 2025-07-16 | End: 2025-07-18 | Stop reason: HOSPADM

## 2025-07-16 RX ORDER — DIPHENOXYLATE HYDROCHLORIDE AND ATROPINE SULFATE 2.5; .025 MG/1; MG/1
2 TABLET ORAL EVERY 6 HOURS PRN
Status: DISCONTINUED | OUTPATIENT
Start: 2025-07-16 | End: 2025-07-16

## 2025-07-16 RX ORDER — CARBOPROST TROMETHAMINE 250 UG/ML
250 INJECTION, SOLUTION INTRAMUSCULAR
Status: DISCONTINUED | OUTPATIENT
Start: 2025-07-16 | End: 2025-07-16

## 2025-07-16 RX ORDER — OXYTOCIN 10 [USP'U]/ML
10 INJECTION, SOLUTION INTRAMUSCULAR; INTRAVENOUS ONCE AS NEEDED
Status: DISCONTINUED | OUTPATIENT
Start: 2025-07-16 | End: 2025-07-18 | Stop reason: HOSPADM

## 2025-07-16 RX ORDER — OXYCODONE AND ACETAMINOPHEN 5; 325 MG/1; MG/1
1 TABLET ORAL EVERY 4 HOURS PRN
Refills: 0 | Status: DISCONTINUED | OUTPATIENT
Start: 2025-07-16 | End: 2025-07-18 | Stop reason: HOSPADM

## 2025-07-16 RX ORDER — ONDANSETRON 8 MG/1
8 TABLET, ORALLY DISINTEGRATING ORAL EVERY 8 HOURS PRN
Status: DISCONTINUED | OUTPATIENT
Start: 2025-07-16 | End: 2025-07-16

## 2025-07-16 RX ORDER — SODIUM CHLORIDE, SODIUM LACTATE, POTASSIUM CHLORIDE, CALCIUM CHLORIDE 600; 310; 30; 20 MG/100ML; MG/100ML; MG/100ML; MG/100ML
INJECTION, SOLUTION INTRAVENOUS CONTINUOUS
Status: DISCONTINUED | OUTPATIENT
Start: 2025-07-16 | End: 2025-07-16

## 2025-07-16 RX ORDER — PRENATAL WITH FERROUS FUM AND FOLIC ACID 3080; 920; 120; 400; 22; 1.84; 3; 20; 10; 1; 12; 200; 27; 25; 2 [IU]/1; [IU]/1; MG/1; [IU]/1; MG/1; MG/1; MG/1; MG/1; MG/1; MG/1; UG/1; MG/1; MG/1; MG/1; MG/1
1 TABLET ORAL DAILY
Status: DISCONTINUED | OUTPATIENT
Start: 2025-07-16 | End: 2025-07-18 | Stop reason: HOSPADM

## 2025-07-16 RX ORDER — OXYCODONE HYDROCHLORIDE 5 MG/1
5 TABLET ORAL EVERY 4 HOURS PRN
Status: DISCONTINUED | OUTPATIENT
Start: 2025-07-16 | End: 2025-07-16

## 2025-07-16 RX ORDER — OXYTOCIN 10 [USP'U]/ML
INJECTION, SOLUTION INTRAMUSCULAR; INTRAVENOUS
Status: DISPENSED
Start: 2025-07-16 | End: 2025-07-17

## 2025-07-16 RX ORDER — OXYTOCIN 10 [USP'U]/ML
10 INJECTION, SOLUTION INTRAMUSCULAR; INTRAVENOUS ONCE AS NEEDED
Status: DISCONTINUED | OUTPATIENT
Start: 2025-07-16 | End: 2025-07-16

## 2025-07-16 RX ORDER — CARBOPROST TROMETHAMINE 250 UG/ML
250 INJECTION, SOLUTION INTRAMUSCULAR
Status: DISCONTINUED | OUTPATIENT
Start: 2025-07-16 | End: 2025-07-18 | Stop reason: HOSPADM

## 2025-07-16 RX ORDER — OXYTOCIN-SODIUM CHLORIDE 0.9% IV SOLN 30 UNIT/500ML 30-0.9/5 UT/ML-%
95 SOLUTION INTRAVENOUS ONCE AS NEEDED
Status: DISCONTINUED | OUTPATIENT
Start: 2025-07-16 | End: 2025-07-16

## 2025-07-16 RX ORDER — MISOPROSTOL 200 UG/1
800 TABLET ORAL ONCE AS NEEDED
Status: DISCONTINUED | OUTPATIENT
Start: 2025-07-16 | End: 2025-07-18 | Stop reason: HOSPADM

## 2025-07-16 RX ORDER — METHYLERGONOVINE MALEATE 0.2 MG/ML
200 INJECTION INTRAVENOUS ONCE AS NEEDED
Status: DISCONTINUED | OUTPATIENT
Start: 2025-07-16 | End: 2025-07-18 | Stop reason: HOSPADM

## 2025-07-16 RX ORDER — DIPHENHYDRAMINE HCL 25 MG
25 CAPSULE ORAL EVERY 4 HOURS PRN
Status: DISCONTINUED | OUTPATIENT
Start: 2025-07-16 | End: 2025-07-18 | Stop reason: HOSPADM

## 2025-07-16 RX ORDER — MISOPROSTOL 200 UG/1
800 TABLET ORAL ONCE AS NEEDED
Status: DISCONTINUED | OUTPATIENT
Start: 2025-07-16 | End: 2025-07-16

## 2025-07-16 RX ORDER — OXYCODONE AND ACETAMINOPHEN 10; 325 MG/1; MG/1
1 TABLET ORAL EVERY 4 HOURS PRN
Refills: 0 | Status: DISCONTINUED | OUTPATIENT
Start: 2025-07-16 | End: 2025-07-18 | Stop reason: HOSPADM

## 2025-07-16 RX ORDER — SODIUM CHLORIDE 0.9 % (FLUSH) 0.9 %
10 SYRINGE (ML) INJECTION
Status: DISCONTINUED | OUTPATIENT
Start: 2025-07-16 | End: 2025-07-18 | Stop reason: HOSPADM

## 2025-07-16 RX ORDER — OXYTOCIN-SODIUM CHLORIDE 0.9% IV SOLN 30 UNIT/500ML 30-0.9/5 UT/ML-%
30 SOLUTION INTRAVENOUS ONCE AS NEEDED
Status: DISCONTINUED | OUTPATIENT
Start: 2025-07-16 | End: 2025-07-16

## 2025-07-16 RX ORDER — OXYTOCIN-SODIUM CHLORIDE 0.9% IV SOLN 30 UNIT/500ML 30-0.9/5 UT/ML-%
30 SOLUTION INTRAVENOUS ONCE AS NEEDED
Status: DISCONTINUED | OUTPATIENT
Start: 2025-07-16 | End: 2025-07-18 | Stop reason: HOSPADM

## 2025-07-16 RX ORDER — SODIUM CHLORIDE 9 MG/ML
INJECTION, SOLUTION INTRAVENOUS
Status: DISCONTINUED | OUTPATIENT
Start: 2025-07-16 | End: 2025-07-16

## 2025-07-16 RX ORDER — METHYLERGONOVINE MALEATE 0.2 MG/ML
200 INJECTION INTRAVENOUS ONCE AS NEEDED
Status: DISCONTINUED | OUTPATIENT
Start: 2025-07-16 | End: 2025-07-16

## 2025-07-16 RX ORDER — LIDOCAINE HYDROCHLORIDE 10 MG/ML
10 INJECTION, SOLUTION INFILTRATION; PERINEURAL ONCE AS NEEDED
Status: DISCONTINUED | OUTPATIENT
Start: 2025-07-16 | End: 2025-07-16

## 2025-07-16 RX ORDER — SIMETHICONE 80 MG
1 TABLET,CHEWABLE ORAL 4 TIMES DAILY PRN
Status: DISCONTINUED | OUTPATIENT
Start: 2025-07-16 | End: 2025-07-16

## 2025-07-16 RX ORDER — TRANEXAMIC ACID 10 MG/ML
INJECTION, SOLUTION INTRAVENOUS
Status: DISCONTINUED
Start: 2025-07-16 | End: 2025-07-16 | Stop reason: WASHOUT

## 2025-07-16 RX ORDER — METHYLERGONOVINE MALEATE 0.2 MG/ML
INJECTION INTRAVENOUS
Status: DISPENSED
Start: 2025-07-16 | End: 2025-07-17

## 2025-07-16 RX ORDER — DIPHENOXYLATE HYDROCHLORIDE AND ATROPINE SULFATE 2.5; .025 MG/1; MG/1
2 TABLET ORAL EVERY 6 HOURS PRN
Status: DISCONTINUED | OUTPATIENT
Start: 2025-07-16 | End: 2025-07-18 | Stop reason: HOSPADM

## 2025-07-16 RX ORDER — OXYTOCIN-SODIUM CHLORIDE 0.9% IV SOLN 30 UNIT/500ML 30-0.9/5 UT/ML-%
95 SOLUTION INTRAVENOUS ONCE AS NEEDED
Status: DISCONTINUED | OUTPATIENT
Start: 2025-07-16 | End: 2025-07-18 | Stop reason: HOSPADM

## 2025-07-16 RX ORDER — HYDROCORTISONE 25 MG/G
CREAM TOPICAL 3 TIMES DAILY PRN
Status: DISCONTINUED | OUTPATIENT
Start: 2025-07-16 | End: 2025-07-18 | Stop reason: HOSPADM

## 2025-07-16 RX ORDER — MUPIROCIN 20 MG/G
OINTMENT TOPICAL
Status: DISCONTINUED | OUTPATIENT
Start: 2025-07-16 | End: 2025-07-16

## 2025-07-16 RX ORDER — DIPHENHYDRAMINE HYDROCHLORIDE 50 MG/ML
25 INJECTION, SOLUTION INTRAMUSCULAR; INTRAVENOUS EVERY 4 HOURS PRN
Status: DISCONTINUED | OUTPATIENT
Start: 2025-07-16 | End: 2025-07-18 | Stop reason: HOSPADM

## 2025-07-16 RX ORDER — SIMETHICONE 80 MG
1 TABLET,CHEWABLE ORAL EVERY 6 HOURS PRN
Status: DISCONTINUED | OUTPATIENT
Start: 2025-07-16 | End: 2025-07-18 | Stop reason: HOSPADM

## 2025-07-16 RX ORDER — MISOPROSTOL 200 UG/1
TABLET ORAL
Status: DISCONTINUED
Start: 2025-07-16 | End: 2025-07-16 | Stop reason: WASHOUT

## 2025-07-16 RX ORDER — FENTANYL/BUPIVACAINE/NS/PF 2MCG/ML-.1
PLASTIC BAG, INJECTION (ML) INJECTION CONTINUOUS PRN
Status: DISCONTINUED | OUTPATIENT
Start: 2025-07-16 | End: 2025-07-16

## 2025-07-16 RX ORDER — CALCIUM CARBONATE 200(500)MG
500 TABLET,CHEWABLE ORAL 3 TIMES DAILY PRN
Status: DISCONTINUED | OUTPATIENT
Start: 2025-07-16 | End: 2025-07-16

## 2025-07-16 RX ORDER — OXYTOCIN-SODIUM CHLORIDE 0.9% IV SOLN 30 UNIT/500ML 30-0.9/5 UT/ML-%
95 SOLUTION INTRAVENOUS CONTINUOUS PRN
Status: DISCONTINUED | OUTPATIENT
Start: 2025-07-16 | End: 2025-07-16

## 2025-07-16 RX ORDER — DOCUSATE SODIUM 100 MG/1
200 CAPSULE, LIQUID FILLED ORAL 2 TIMES DAILY PRN
Status: DISCONTINUED | OUTPATIENT
Start: 2025-07-16 | End: 2025-07-18 | Stop reason: HOSPADM

## 2025-07-16 RX ADMIN — DOCUSATE SODIUM 200 MG: 100 CAPSULE, LIQUID FILLED ORAL at 08:07

## 2025-07-16 RX ADMIN — PRENATAL VIT W/ FE FUMARATE-FA TAB 27-0.8 MG 1 TABLET: 27-0.8 TAB at 08:07

## 2025-07-16 RX ADMIN — LIDOCAINE HYDROCHLORIDE AND EPINEPHRINE 3 ML: 15; 5 INJECTION, SOLUTION EPIDURAL at 03:07

## 2025-07-16 RX ADMIN — SODIUM CHLORIDE, POTASSIUM CHLORIDE, SODIUM LACTATE AND CALCIUM CHLORIDE 500 ML: 600; 310; 30; 20 INJECTION, SOLUTION INTRAVENOUS at 02:07

## 2025-07-16 RX ADMIN — OXYCODONE 5 MG: 5 TABLET ORAL at 08:07

## 2025-07-16 RX ADMIN — SIMETHICONE 80 MG: 80 TABLET, CHEWABLE ORAL at 11:07

## 2025-07-16 RX ADMIN — Medication 95 MILLI-UNITS/MIN: at 05:07

## 2025-07-16 RX ADMIN — OXYCODONE 5 MG: 5 TABLET ORAL at 03:07

## 2025-07-16 RX ADMIN — IBUPROFEN 600 MG: 600 TABLET ORAL at 11:07

## 2025-07-16 RX ADMIN — Medication 12 ML/HR: at 03:07

## 2025-07-16 RX ADMIN — IBUPROFEN 600 MG: 600 TABLET ORAL at 07:07

## 2025-07-16 RX ADMIN — IBUPROFEN 600 MG: 600 TABLET ORAL at 01:07

## 2025-07-16 NOTE — NURSING
PP vag delivery. VSS. Fundus firm, bleeding light. Pt ambulated to bathroom and voided. Clean pads and panties applied. Pt NPO except sips of water. Awaiting BTL at noon. Epidural cath in place and capped off per anesthesia request. No complaints at this time.

## 2025-07-16 NOTE — ANESTHESIA PROCEDURE NOTES
Epidural    Patient location during procedure: OB   Reason for block: primary anesthetic   Reason for block: labor analgesia requested by patient and obstetrician  Diagnosis: IUP   Start time: 7/16/2025 3:20 AM  Timeout: 7/16/2025 3:18 AM  End time: 7/16/2025 3:44 AM  Surgery related to: Planned vaginal delivery    Staffing  Performing Provider: Adolfo Beltre CRNA  Authorizing Provider: Daniele Simms MD    Staffing  Performed by: Adolfo Beltre CRNA  Authorized by: Daniele Simms MD        Preanesthetic Checklist  Completed: patient identified, IV checked, site marked, risks and benefits discussed, surgical consent, monitors and equipment checked, pre-op evaluation, timeout performed, anesthesia consent given, hand hygiene performed and patient being monitored  Preparation  Patient position: sitting  Prep: Betadine  Patient monitoring: Pulse Ox and Blood Pressure  Reason for block: primary anesthetic   Epidural  Skin Anesthetic: lidocaine 1%  Skin Wheal: 3 mL  Administration type: continuous  Approach: midline  Interspace: L3-4    Injection technique: PETER air  Needle and Epidural Catheter  Needle type: Tuohy   Needle gauge: 17  Needle length: 3.5 inches  Needle insertion depth: 4 cm  Catheter type: springwound and multi-orifice  Catheter size: 18 G  Catheter at skin depth: 9 cm  Insertion Attempts: 3 (Tight spaces. Had to move 1 level below from initial attempts)  Test dose: 3 mL of lidocaine 1.5% with Epi 1-to-200,000  Additional Documentation: incremental injection, negative aspiration for heme and CSF, no paresthesia on injection, no signs/symptoms of IV or SA injection, no significant pain on injection and no significant complaints from patient  Needle localization: anatomical landmarks  Medications:  Volume per aspiration: 0 mL  Time between aspirations: 2 minutes   Assessment  Upper dermatomal levels - Left: T10  Right: T10   Dermatomal levels determined by pinch or prick  Ease of block:  moderate  Patient's tolerance of the procedure: comfortable throughout block and no complaints No inadvertent dural puncture with Tuohy.  Dural puncture not performed with spinal needle

## 2025-07-16 NOTE — L&D DELIVERY NOTE
[facility] - Labor & Delivery  Vaginal Delivery   Operative Note    SUMMARY     Normal spontaneous vaginal delivery of live infant, was placed on mothers abdomen for skin to skin and bulb suctioning performed.  Infant delivered position OA over intact perineum.  Nuchal cord: No.    Spontaneous delivery of placenta and IV pitocin given noting good uterine tone.  No lacerations noted.  Patient tolerated delivery well. Sponge needle and lap counted correctly x2.    Indications:  (spontaneous vaginal delivery)  Pregnancy complicated by: Problem List[1]  Admitting GA: 39w5d    Delivery Information for Bita Guerra    Birth information:  YOB: 2025   Time of birth: 5:53 AM   Sex: female   Head Delivery Date/Time:     Delivery type:    Gestational Age: 39w5d       Delivery Providers    Delivering clinician: Marianne Castro MD   Provider Role    Marianne Castro MD Physician    Victor M Ignacio, [facility]    Elise Perry RN Registered Nurse    Marcelle Goldsmith RN Registered Nurse              Measurements    Weight:   Length:          Apgars    Living status: Living  Apgar Component Scores:  1 min.:  5 min.:  10 min.:  15 min.:  20 min.:    Skin color:         Heart rate:         Reflex irritability:         Muscle tone:         Respiratory effort:         Total:                  Operative Delivery    Forceps attempted?: No  Vacuum extractor attempted?: No         Shoulder Dystocia    Shoulder dystocia present?: No           Presentation    Presentation: Vertex  Position: Middle Occiput Anterior           Interventions/Resuscitation    Method: Bulb Suctioning, Tactile Stimulation       Cord    Vessels: 3 vessels  Complications: None  Delayed Cord Clamping?: Yes  Cord Blood Disposition: Lab  Gases Sent?: No  Stem Cell Collection (by MD): No       Placenta    Placenta delivery date/time:   Placenta removal: Spontaneous  Placenta appearance: Intact  Placenta disposition: Discarded            Labor Events:       labor: No     Labor Onset Date/Time:         Dilation Complete Date/Time:         Start Pushing Date/Time:         Start Pushing Date/Time:       Rupture Date/Time: 25 0525        Rupture type: SRM (Spontaneous Rupture)        Fluid Amount:       Fluid Color: Clear              steroids:       Antibiotics given for GBS: No     Induction: none     Indications for induction:        Augmentation:       Indications for augmentation:       Labor complications: None     Additional complications:          Cervical ripening:                     Delivery:      Episiotomy: None     Indication for Episiotomy:       Perineal Lacerations: None Repaired:      Periurethral Laceration:   Repaired:     Labial Laceration:   Repaired:     Sulcus Laceration:   Repaired:     Vaginal Laceration:   Repaired:     Cervical Laceration:   Repaired:     Repair suture: None     Repair # of packets: 0     Last Value - EBL - Nursing (mL):       Sum - EBL - Nursing (mL): 0     Last Value - EBL - Anesthesia (mL):      Calculated QBL (mL):       Running total QBL (mL):       Vaginal Sweep Performed: Yes     Surgicount Correct: Yes     Vaginal Packing: No Quantity:       Other providers:       Anesthesia    Method: Epidural          Details (if applicable):  Trial of Labor      Categorization:      Priority:     Indications for :     Incision Type:       Additional  information:  Forceps:    Vacuum:    Breech:    Observed anomalies    Other (Comments):              [1]   Patient Active Problem List  Diagnosis    Acute appendicitis with localized peritonitis, without perforation, abscess, or gangrene     (spontaneous vaginal delivery)    Encounter for supervision of normal pregnancy in third trimester    Unwanted fertility

## 2025-07-16 NOTE — ANESTHESIA PREPROCEDURE EVALUATION
07/16/2025  [facility] Micky Guerra is a 30 y.o., female.      Pre-op Assessment    I have reviewed the Patient Summary Reports.     I have reviewed the Nursing Notes. I have reviewed the NPO Status.   I have reviewed the Medications.     Review of Systems  Anesthesia Hx:  No problems with previous Anesthesia   History of prior surgery of interest to airway management or planning:          Denies Family Hx of Anesthesia complications.    Denies Personal Hx of Anesthesia complications.                    Social:  Non-Smoker, No Alcohol Use       Hematology/Oncology:  Hematology Normal   Oncology Normal                                   EENT/Dental:  EENT/Dental Normal           Cardiovascular:  Cardiovascular Normal                                              Pulmonary:  Pulmonary Normal                       Renal/:  Renal/ Normal                 Hepatic/GI:  Hepatic/GI Normal                    Musculoskeletal:  Musculoskeletal Normal                Neurological:  Neurology Normal                                      Endocrine:  Endocrine Normal            Dermatological:  Skin Normal    Psych:  Psychiatric Normal                    Physical Exam  General: Well nourished, Cooperative and Alert    Airway:  Mallampati: II / II  Mouth Opening: Normal  TM Distance: Normal  Tongue: Normal  Neck ROM: Normal ROM    Dental:  Intact        Anesthesia Plan  Type of Anesthesia, risks & benefits discussed:    Anesthesia Type: Epidural  Intra-op Monitoring Plan: Standard ASA Monitors  Post Op Pain Control Plan: epidural analgesia  Induction:  IV  Airway Plan: Direct  Informed Consent: Informed consent signed with the Patient and all parties understand the risks and agree with anesthesia plan.  All questions answered. Patient consented to blood products? Yes  ASA Score: 2  Day of Surgery Review of History &  Physical: H&P Update referred to the surgeon/provider.    Ready For Surgery From Anesthesia Perspective.     .

## 2025-07-16 NOTE — OP NOTE
OPERATIVE REPORT    Date of procedure: 07/16/2025    PREOPERATIVE DIAGNOSIS  1.   Unwanted Fertility    POSTOPERATIVE DIAGNOSIS  Unwanted Fertility  S/P postpartum tubal ligation    PROCEDURE:  1. Postpartum tubal ligation    SURGEON: Kendal Conteh MD    ASSISTANT: Marcos Duran    ANESTHESIA: General    COMPLICATIONS: None    EBL: Minimal > 5 cc    IV FLUIDS: See anesthesia record    URINE OUTPUT: See anesthesia record    FINDINGS:   Normal appearing bilateral fallopian tubes    PROCEDURE:   The patient was taken to the OR and prepped and draped in a sterile fashion. Her epidural was re-dosed and found to be adequate. The abdomen was entered theough a small transverse infraumbilical skin incision. The patient's left Fallopian tube was then identified, grasped with a Santosh clamp, and followed out to the fimbria. A Harbeson clamp was used to grasp the tube approximately 4 cm from the mid-isthmic portion. A 3 cm segment of tube was then ligated with two free ties of 0 chromic suture in a modified Landon technique and excised with Metzenbaum scissors. Tubal ostia were identified, good hemostasis was noted and the tube was returned to the abdomen. The right Fallopian tube was identified and grasped in the mid-isthmic region, doubly ligated and a 3 cm segment was excised in a similar fashion. Good hemostasis was noted and the tube was returned to the abdomen.     The peritoneum and fascia were then closed in a single layer using #1 PDS vicryl. The skin was closed in a subcuticular fashion using 4-0 monocryl. The patient tolerated the procedure well. Sponge, lap, and needle count were correct x2. The patient was taken to the recovery room in stable condition.      Kendal Conteh M.D.  OB/GYN  [facility]

## 2025-07-16 NOTE — DISCHARGE INSTRUCTIONS
Instrucciones de la lactancia maternal para los bebes recién nacidos:   La [employer] recomienda la leche maternal joe la unica Polly de nutrición para guzman bebé liz los primeros 6 meses de la dorie, y puede continuar, con la introducción de comida, hasta y despues de 1 ano de edad. Informado sobre coreas consejos: Hay muchos beneficios de amamantar para el barry de la madre y del bebé. Rosanne los chupones, teteras, o botellas por lo menos por las primeras 4 semanas. Usar los chupones, teteras, o botellas pueden interumpir el proceso de amamantar.   Alimenta en cuanto hay muestras de hambre:  Annabella en la boca, hacienda movimientos de succionar.  Ruiditos suavecitos o estirarse  Llorar es un signo de hambre tardío: no esperes a que el bebé llore.  Es de esperarse que haya 8-12 alimentaciones por 24 horas, aunque estas alimentaciones no sigan un horario definido.  Alterna el seno con el que empiezas, o empieza con el seno que se siente más lleno.  Cambia de lado cuando el bebé empiece a tragar más despacio o se desconecte del seno.  Esta christ si el bebé no come del philip seno en cada alimentación.  Si el bebé esta muy dormido o somnoliente: el contacto de piel a piel puede animarlo a empezar a comer:  Quítale la camiseta y acuéstalo sobre tu pecho desnudo  Duerme cerca de tu bebé, aun en la casa. Aprende a daniel pecho acostada.  En la posición correcta los dos estarán cómodos:  barbilla con seno, pecho con pecho  Labio del bebé abierto hacia afuera para tragar: el labio del bebé debe estar volteado hacia afuera  Boca abierta christ denisa: la boca del bebé cubre la mayoría de la areola (el área oscura del seno)--no nada más el pezón  Fíjate en los signos de que hay transferencia de leche:  Puedes oír al bebé tragar.  No se oyen ruidos más o menos billie joe clic.  El bebé no demuestra que tiene más hambre después de la alimentación.  El cuerpo del bebé y colin annabella están relajados por un tiempo  corto.  Lo que entra, tiene que salir. Está pendiente de:  Al cuarto día, por lo menos 3 cacas al día.  La brianda cambia de elmira a yoav o café y luego a amarillo más líquido cuando baja tu leche.  Después del cuarto día, por lo menos 6 pa?ales mojados/pesados al día.  La orina debe ser de color amarillo rodney cuando baja tu leche.  Debes cuco agua cuando tienes sed y comer alimentos sanos cuando tiene hambre. Rupali siesta para descansar lo suficiente.   No tomes medicamentos o alcohol sino con el consejo de guzman doctor. Puedes llamar al [facility] (Infant Risk Center): ([phone]), Lunes a Viernes, 8am-5pm, para obtener información sobre los medicamentos y la leche maternal.  La joel de seguimiento con la pediatra debe ser 2 días después de salir del hospital. El bebé deberia soraya ganado el peso de nacimiento cuando tiene 10-14 días de dorie.    Recursos comunitarios:    [facility] Línea directa de lactancia materna: [phone]    Centros hospitalarios de lactancia materna/Consultores de lactancia:  [facility] [phone]  [facility] [phone]  Consultas ambulatorias sobre lactancia de [facility] [phone]  [facility] [phone]    [facility] de Envenenamientos [phone] [email or web address].org  Asesoramiento médico gratuito las 24 horas del día, los 7 días de la semana a través de la Línea de ayuda contra intoxicaciones y la herramienta en línea    WIC (karishma hammer, gabrielle): [phone] ldh.la.gov/WIC  Un programa de nutrición a nivel estatal para mujeres embarazadas, lactantes y posparto, bebés y niños menores de 5 años. Proporciona información sobre alimentos y nutrición. También proporciona apoyo a la lactancia materna por parte de madres consejeras.    Sitio web del Dr. Rene Willard para vídeos sobre el cierre e información general:        www.[email or web address]  Infant Risk Center es un centro de llamadas que james información sobre la  seguridad de cuco medicamentos liz la lactancia.  Llame al [phone], de lunes a viernes, de 8 a.m. a 5 p.m., CT.  La Asociación Internacional de Consultores en Lactancia proporciona recursos de asistencia:        www.ilca.org  La Coalición de Lactancia Materna de isiana proporciona información y recursos para padres  y la comunidad [email or web address] Illastfeeding.org  Búsqueda por código postal de recursos sobre lactancia materna y más:                                                                              [email or web address]       Socios para bebés sanos: [phone])    Recursos del área de [facility]:   Servicios de [facility]: [email or web address]  Los centros de barry comunitarios están ubicados en Steven, Henrietta, Estela, Polo, Omar, Yesenia Morales, [place], [facility] y [facility]. Ofrece servicios pediátricos, servicios de barry para mujeres, servicios WIC y más.   Bebé Café [email or web address]  Grupos informales, gratuitos y sin joel previa de apoyo a la lactancia materna que ofrecen atención e intervención profesional en lactancia.  Yesenia GAMEZLA Baby Café se reúne los jueves en [facility] (ubicado en el salón Creole en el primer piso) de 1:00 p. m. a 3:00 p. m. Un traductor de español está disponible en esta reunión.   Birthmark Doulas/Centro de lactancia materna de [facility]: [email or web address]  Entrega de alimentación infantil en clínicas, servicios de lactancia, grupos de apoyo, programas educativos.   Café con leche: [phone] [email or web address]  Benedicto de apoyo gratuito a la lactancia materna para familias de color   Anidación de LUIS ALBERTO: [phone] [email or web address]  Apoyo presencial y virtual para familias liz el embarazo, el parto y la paternidad temprana.   [facility], [place] (Lucy Ojeda RN, IBCLC)  [phone]  [email or web address]  [email or web address] Island [email or web address]   Medicina Avanzada de Lactancia  Materna de [place]: Dra. Susan Victoria.  [address] [place] [address]  [email or web address]  [phone]  [email or web address]   Comienzo Saludable [place].  [phone] (Huong) [phone] (Carlos)  [email or web address]  Atiende a mujeres en edad fértil y aborda problemas de mujeres embarazadas y colin hijos desde el nacimiento hasta los dos años.   Liga [place]ec[facility] [place] de [place] Asif.Teravac/[employer]/SocialMadeSimpleluke  Grupos de apoyo madre a madre presenciales y virtuales con educación, información, apoyo y estímulo a las mujeres que florence amamantar.     Instrucciones Para Daniel de Inge    Instrucciones a Seguir    Dieta regular  Actividad: Aumentarntar gradualmente  Vanessa: Regadera o Apryl  Restricciones: No levantar nada pesado  Cuidado Personal: No tampones o duchas vaginales  Actividad Sexual: Rosanne relaciones sexuales  Planificacion Familiar: Consulta con guzman medico  Cuidado de los Senos: Use un sosten de soporte  Regresar al trabajo/escuela: Cuando guzman medico le indique    Cuando debe llamar al Doctor    Fiebre de 100.4 o mas alto  Nausea/Vomito persistente  Secrecion de la incision  Felice sangrado vaginal o coagulos  Inflamacion/dolor en los brazos o las piernas  Severo dolor de gabriella, vision borrosa or desmayos  Frequencia/ardor urinario  Senales de depresion post-parto        La Depresion Post-Parto    Usted acaba de tener un jenaro'.  A pesar de que sabe que deberia sentirse emocionada y mendes, lo que seinte son ganas de llorar sin motivo y tiene dificultades para realizar colin tareas diarias.  Usted pasa la mayor parte del tiempo reza, cansada y desesperanzada, y hasta podria sentirse avergonzada o culpable.  Nicol lo que le esta sucediendo no es culpa suya, y puede sentirse mejor.  Hable con guzman medico para que la ayude.     La Depresion Despues del Parto    Gary vez sienta cansancio y ganas de llorar tawnya despues de daniel a mahendra.   "Esta etapa melancolica, denominada "baby blues", puede hacerla sentir reza y desesperanzada, o temerosa de que algo dixie le vaya a pasar al jenaro.  Algunas mujeres hasta llegan a poner en jane el que puedan ser buenas madres.    Que' es la Depresion?    La depresion es un trastorno del animo que afecta guzman manera de pernsar y de sentir.  El sintoma mas frecuente es un sentimiento de honda tristeza: tambien podria causane la sensacion de que ya no puede sobrellevar la dorie.    Otros sintomas incluyen:      * Ganar o perder mucho peso  * Dormir en exceso o demasiado poco  * Estar cansada todo el tiempo  * Sentirse inquieta  * Tener miedo de querer herir al jenaro'  * No tener interes por el jenaro'  * Sentirse inutil o culpable   * No encontrar placer en las cosas que solia gustarle hacer  * Dificultades para pensar con claridad o cuco decisiones  * Pensar sobre la muerte o el suicidio     Que' Causa la Depresion Postparto?    La causea exacta de la depresion postarto se desconce, aunque posiblemente es el resultado de los cambios hormonales que suceden liz y despues del parto.  Tambien puede deberse al cansancio que le causan las exigencias del jenaro' y el proceso de adaptacion a guzman maternidad.  Todos estos factores podrian deprimirla.  En algunos casos, existe rayne predisposicion genetica a fidelia tipo de depresion.    La depresion puede tratarse    Lo munoz es que hay muchas maneras de tratar la depresion postparo.  Emprenda el primer paso para sentirse mejor hablando con guzman medico.     Recursos    * National Institutes of Mental Health-- [phone]     www.[facility].nih.gov    * National Marshall on Mental Illness -- [phone]     Www.allegra.org    * Mental Health Maria Fernanda --  [phone]     [email or web address].org    * National Suidide Hotline -- [phone]    [phone] [employer]  Todos los derechos reservados.  Esta informacion no pretende sustituir las atencion medica profesional.  Solo guzman medico puede " diagnosticar y tratar un problema de barry.

## 2025-07-17 LAB
ABSOLUTE EOSINOPHIL (OHS): 0.02 K/UL
ABSOLUTE MONOCYTE (OHS): 0.5 K/UL (ref 0.3–1)
ABSOLUTE NEUTROPHIL COUNT (OHS): 4.73 K/UL (ref 1.8–7.7)
BASOPHILS # BLD AUTO: 0.03 K/UL
BASOPHILS NFR BLD AUTO: 0.4 %
BILE AC SERPL-SCNC: 8 MCMOL/L
ERYTHROCYTE [DISTWIDTH] IN BLOOD BY AUTOMATED COUNT: 15.3 % (ref 11.5–14.5)
HCT VFR BLD AUTO: 33.4 % (ref 37–48.5)
HGB BLD-MCNC: 10.8 GM/DL (ref 12–16)
IMM GRANULOCYTES # BLD AUTO: 0.03 K/UL (ref 0–0.04)
IMM GRANULOCYTES NFR BLD AUTO: 0.4 % (ref 0–0.5)
LYMPHOCYTES # BLD AUTO: 2.88 K/UL (ref 1–4.8)
MCH RBC QN AUTO: 28.8 PG (ref 27–31)
MCHC RBC AUTO-ENTMCNC: 32.3 G/DL (ref 32–36)
MCV RBC AUTO: 89 FL (ref 82–98)
NUCLEATED RBC (/100WBC) (OHS): 0 /100 WBC
PLATELET # BLD AUTO: 148 K/UL (ref 150–450)
PMV BLD AUTO: 10.8 FL (ref 9.2–12.9)
RBC # BLD AUTO: 3.75 M/UL (ref 4–5.4)
RELATIVE EOSINOPHIL (OHS): 0.2 %
RELATIVE LYMPHOCYTE (OHS): 35.2 % (ref 18–48)
RELATIVE MONOCYTE (OHS): 6.1 % (ref 4–15)
RELATIVE NEUTROPHIL (OHS): 57.7 % (ref 38–73)
WBC # BLD AUTO: 8.19 K/UL (ref 3.9–12.7)

## 2025-07-17 PROCEDURE — 36415 COLL VENOUS BLD VENIPUNCTURE: CPT | Performed by: OBSTETRICS & GYNECOLOGY

## 2025-07-17 PROCEDURE — 25000003 PHARM REV CODE 250: Performed by: OBSTETRICS & GYNECOLOGY

## 2025-07-17 PROCEDURE — 11000001 HC ACUTE MED/SURG PRIVATE ROOM

## 2025-07-17 PROCEDURE — 88302 TISSUE EXAM BY PATHOLOGIST: CPT | Mod: 26,,, | Performed by: PATHOLOGY

## 2025-07-17 PROCEDURE — 85025 COMPLETE CBC W/AUTO DIFF WBC: CPT | Performed by: OBSTETRICS & GYNECOLOGY

## 2025-07-17 PROCEDURE — 88302 TISSUE EXAM BY PATHOLOGIST: CPT | Mod: TC | Performed by: STUDENT IN AN ORGANIZED HEALTH CARE EDUCATION/TRAINING PROGRAM

## 2025-07-17 RX ADMIN — PRENATAL VIT W/ FE FUMARATE-FA TAB 27-0.8 MG 1 TABLET: 27-0.8 TAB at 10:07

## 2025-07-17 RX ADMIN — OXYCODONE AND ACETAMINOPHEN 1 TABLET: 325; 10 TABLET ORAL at 08:07

## 2025-07-17 RX ADMIN — OXYCODONE AND ACETAMINOPHEN 1 TABLET: 325; 10 TABLET ORAL at 04:07

## 2025-07-17 RX ADMIN — DOCUSATE SODIUM 200 MG: 100 CAPSULE, LIQUID FILLED ORAL at 10:07

## 2025-07-17 RX ADMIN — OXYCODONE AND ACETAMINOPHEN 1 TABLET: 325; 10 TABLET ORAL at 10:07

## 2025-07-17 RX ADMIN — SIMETHICONE 80 MG: 80 TABLET, CHEWABLE ORAL at 10:07

## 2025-07-17 RX ADMIN — IBUPROFEN 600 MG: 600 TABLET ORAL at 11:07

## 2025-07-17 RX ADMIN — OXYCODONE AND ACETAMINOPHEN 1 TABLET: 325; 10 TABLET ORAL at 12:07

## 2025-07-17 RX ADMIN — OXYCODONE AND ACETAMINOPHEN 1 TABLET: 325; 10 TABLET ORAL at 03:07

## 2025-07-17 NOTE — PLAN OF CARE
VSS.  Abdominal dsg C/D/I.  Reports pain meds effective.  Encouraged to feed infant 8 or more times in 24 hours and observe for hunger cues.  Verbalized understanding.

## 2025-07-17 NOTE — PROGRESS NOTES
POSTPARTUM PROGRESS NOTE     Jak Guerra is a 30 y.o. female PPD #1 status post Spontaneous vaginal delivery at 39w5d as well as POD # 1 s/p postpartum tubal ligation. This pregnancy is complicated by unwanted fertility. Patient is doing well this morning. She denies nausea, vomiting, fever or chills. Patient reports mild abdominal pain that is adequately relieved by oral pain medications. Lochia is mild to moderate  and decreasing. Patient is voiding without difficulty and ambulating with no difficulty. She has passed flatus, and has not had BM. Patient does plan to breast feed. S/p PP Tubal for contraception.     Objective:       Temp:  [98.2 °F (36.8 °C)-98.8 °F (37.1 °C)] 98.2 °F (36.8 °C)  Pulse:  [56-70] 59  Resp:  [17-20] 18  SpO2:  [97 %-100 %] 98 %  BP: (102-134)/(60-84) 103/64    General:   alert, appears stated age, and cooperative   Lungs:   Non-labored breathing   Heart:   regular rate and rhythm   Abdomen:  soft, non-tender; bowel sounds normal; no masses,  no organomegaly   Uterus:  firm located at the umblicus.    Incision: Bandage in place, clean, dry and intact   Extremities: no pedal edema noted     Lab Review  No results found for this or any previous visit (from the past 4 hours).    I/O    Intake/Output Summary (Last 24 hours) at 7/17/2025 1155  Last data filed at 7/16/2025 2200  Gross per 24 hour   Intake --   Output 700 ml   Net -700 ml        Assessment:     Problem List[1]     Plan:   1. Postpartum care:  - Patient doing well. Continue routine management and advances.  - Continue PO pain meds. Pain well controlled.  - Heme: H/H - 10.8/31.8 > 10.8/33.4   - Encourage ambulation  - Contraception - S/P PP BTL  - Lactation consultant following        Dispo: As patient meets milestones, will plan to discharge PPD# 2.    Kendal Conteh         [1]   Patient Active Problem List  Diagnosis    Acute appendicitis with localized peritonitis, without perforation, abscess, or gangrene      (spontaneous vaginal delivery)    Encounter for supervision of normal pregnancy in third trimester    Unwanted fertility

## 2025-07-17 NOTE — ANESTHESIA POSTPROCEDURE EVALUATION
Anesthesia Post Evaluation    Patient: Jak Guerra    Procedure(s) Performed: Procedure(s) (LRB):  LIGATION, FALLOPIAN TUBE, POSTPARTUM (Bilateral)    Final Anesthesia Type: epidural      Patient location during evaluation: PACU  Patient participation: Yes- Able to Participate  Level of consciousness: awake and alert  Post-procedure vital signs: reviewed and stable  Pain management: adequate  Airway patency: patent    PONV status at discharge: No PONV  Anesthetic complications: no      Cardiovascular status: stable  Respiratory status: spontaneous ventilation  Hydration status: euvolemic  Follow-up not needed.          Vitals Value Taken Time   /60 07/17/25 04:00   Temp 37.1 °C (98.8 °F) 07/17/25 04:00   Pulse 60 07/17/25 04:00   Resp 18 07/17/25 04:00   SpO2 97 % 07/17/25 04:00         Event Time   Out of Recovery 13:30:00         Pain/Devonte Score: Pain Rating Prior to Med Admin: 8 (7/17/2025  4:00 AM)  Pain Rating Post Med Admin: 0 (7/17/2025  5:00 AM)

## 2025-07-17 NOTE — LACTATION NOTE
Assessment of BF dyad. Mom states infant has been put to BR but hasn't latched yet. Mom states that she wasn't able to latch any of her other children as none of them would latch. Further assessment showed mom has bilat inverted nipples. Infant due to feed and infant sleepy. Mom approves and infant undressed down to diaper and placed S2S onto left BR into cradle hold position. Infant reluctant to latch.  Instr mom on proper latch: infant's nose ot mom's nipple, when infant opens her mouth wide, to bring infant to BR. After several attempts, mom requests infant move to right BR. Infant placed in S2S onto right BR.  Infant latches to right BR, takes a few sucks and releases BR. Instr mom on BR compressions and instr to use BR compressions as soon as infant latches in order to keep infant suckling and to keep nipple everted for feeding. Mom successfully return demonstrates use of BR compressions. Mom is ready to have infant be fed formula and Grandmother takes infant to feed formula. Infant very gaggy and spitting up. Infant suctioned with bulb syringe several times. Bridget RN notified. Infant color remained pink. Infant dressed and swaddled. Mom set up with Loccit (ML4D) hospital grade pump. Instr on proper set up and use of Medela BR pump. Instr on good handwashing prior to each pumping session and that when soap and water aren't available, that it is recommended and approved to use hand . Instr on cleaning and sterilizing pump parts. Instr on BR milk storage, thawing and warming. Instr that BR milk never goes in the microwave as it destroys all good properties in milk and creates hot spots that will burn the infant. Mom pumps for 15 minutes and obtains a few drops of colostrum from left BR. Unable to capture the few drops in a syringe. Instr mom that it is recommended to use BR shells in order to assist with getting bilat nipples to beck. Mom agreeable and mom assisted with putting on bra. Instr  mom on use of BR shells and BR shells placed inside bra. Instr mom to wear BR shells whenever not feeding infant and to clean with soap and water then, rinse well once every 24 hours and if they get dirty and that they only need to be sterilized if they come in contact with BR milk. BR shells are air dried on a clean towel on the counter. Instr mom on hunger cues. Instr mom to watch for hunger cues, offer BR when she sees hunger cues, allowing infant to feed for as long as she will nurse then, follow with formula and pumping for 15 minutes until infant is nursing well. Instr mom that the pumping is to stimulate the BR so that he body knows to make milk and it is ok if she does or doesn't get EBM. Mom states she has been instr on use of bulb syringe to suction infant. Bulb syringe use for infant suction reveiwed with mom. Instr mom that if infant begins spitting and gagging again, to press her nurse call light, sit infant up and suction mouth as needed. Mom denies having any questions or concerns. Instr mom to call for assistance for latching infant as she needs assistance and for any questions or concerns. Mom states good understanding of all instr. Praise and encouragment given.    [facility] - Mother & Baby  Lactation Note - Mom    SUMMARY     Maternal Assessment    Breast Shape: Bilateral:, round  Breast Density: Bilateral:, soft  Areola: Bilateral:, elastic  Nipples: Bilateral:, inverted  Left Nipple Symptoms:  (mom c/o little soreness left nipple only after pumping. Mom denied pain at time of pumping. Instr mom to turn pump setting down at next session to see if that helps, if not turn down again then, change flange if not better. mom states good understanding.)  Right Nipple Symptoms:  (denies pain)      LATCH Score         Breasts WDL    Breast WDL: WDL except, nipple symptoms  Left Nipple Symptoms:  (mom c/o little soreness left nipple only after pumping. Mom denied pain at time of pumping. Instr mom to turn  pump setting down at next session to see if that helps, if not turn down again then, change flange if not better. mom states good understanding.)  Right Nipple Symptoms:  (denies pain)    Maternal Infant Feeding    Maternal Preparation: breast care, hand hygiene  Maternal Emotional State: assist needed, tense  Infant Positioning: cradle  Signs of Milk Transfer: other (see comments) (few non-nutritive sucks)  Pain with Feeding: no  Comfort Measures Before/During Feeding: infant position adjusted, latch adjusted, maternal position adjusted  Comfort Measures Following Feeding: breast shell(s) used  Nipple Shape After Feeding, Left: no latch achieved  Nipple Shape After Feeding, Right: slightly everted, round  Latch Assistance: yes    Lactation Referrals    Community Referrals: outpatient lactation program  Outpatient Lactation Program Lactation Follow-up Date/Time: call lact ctr prn    Lactation Interventions    Breastfeeding Assistance: assisted with positioning, assisted with techniques for flat/inverted nipples, electric breast pump used, feeding cue recognition promoted, feeding on demand promoted, feeding session observed, infant latch-on verified, nipple shell utilized, supplemental feeding provided, support offered  Breastfeeding Assistance: assisted with positioning, assisted with techniques for flat/inverted nipples, electric breast pump used, feeding cue recognition promoted, feeding on demand promoted, feeding session observed, infant latch-on verified, nipple shell utilized, supplemental feeding provided, support offered  Breastfeeding Support: encouragement provided, lactation counseling provided       Breastfeeding Session    Breast Pumping Interventions: early pumping promoted, frequent pumping encouraged, post-feed pumping encouraged  Infant Positioning: cradle  Signs of Milk Transfer: other (see comments) (few non-nutritive sucks)    Maternal Information

## 2025-07-17 NOTE — LACTATION NOTE
Follow up of BF dyad. Mom states infant isn't latching onto right BR but is latching and nursing on left BR. Mom states she continues to wear BR shells and they are helping. Mom states she isn't keeping track of how long infant is nursing on left BR. Mom states she is following BF with formula. Instr mom to time how long infant is nursing on left BR so it can be followed. Mom states she isn't pumping after feedings as it is painful. Reinstructed mom on recommendation to pump for 15 min after each feeding to assist with ensuring a good milk supply. Mom with a room full of visitors at present. Instr mom to watch for hunger cues, when she sees hunger cues, to offer right BR first, then, left BR then, follow with formula and recommended to pump for 15 min following each feeding. Mom denies having any questions or concerns. Instr mom to call for any need for latch assist, questions or concerns. Mom states good understanding of all instr. Praise and encouragement given.    [place] [facility]  Lactation Note - Mom    SUMMARY     Maternal Assessment    Breast Shape: Bilateral:, round  Breast Density: Bilateral:, soft  Areola: Bilateral:, elastic  Nipples: Bilateral:, inverted  Left Nipple Symptoms:  (inverted. mom c/o pain with pumping and choosing not to follow feedings with pumping. Reinstructed on recommendation to follow feedings with 15min of pumping to help ensure good milk supply. mom states good understanding.)  Right Nipple Symptoms:  (inverted; mom states not pumping after feedings due to pain. Reinstructed on recommendation to follow feedings with 15min of pumping to help ensure a good milk supply. mom states good understanding.)      LATCH Score         Breasts WDL    Breast WDL: WDL except, nipple symptoms  Left Nipple Symptoms:  (inverted. mom c/o pain with pumping and choosing not to follow feedings with pumping. Reinstructed on recommendation to follow feedings with 15min of pumping to help ensure good  milk supply. mom states good understanding.)  Right Nipple Symptoms:  (inverted; mom states not pumping after feedings due to pain. Reinstructed on recommendation to follow feedings with 15min of pumping to help ensure a good milk supply. mom states good understanding.)    Maternal Infant Feeding    Maternal Preparation: breast care, hand hygiene  Maternal Emotional State: assist needed, tense  Infant Positioning: cradle  Signs of Milk Transfer: other (see comments) (few non-nutritive sucks)  Pain with Feeding: no  Comfort Measures Before/During Feeding: infant position adjusted, latch adjusted, maternal position adjusted  Comfort Measures Following Feeding: breast shell(s) used  Nipple Shape After Feeding, Left: no latch achieved  Nipple Shape After Feeding, Right: slightly everted, round  Latch Assistance: no    Lactation Referrals    Community Referrals: outpatient lactation program  Outpatient Lactation Program Lactation Follow-up Date/Time: call lact ctr prn    Lactation Interventions    Breastfeeding Assistance: support offered  Breastfeeding Assistance: support offered  Breastfeeding Support: encouragement provided, lactation counseling provided       Breastfeeding Session    Breast Pumping Interventions: early pumping promoted, frequent pumping encouraged, post-feed pumping encouraged  Infant Positioning: cradle  Signs of Milk Transfer: other (see comments) (few non-nutritive sucks)    Maternal Information

## 2025-07-17 NOTE — PLAN OF CARE
VSS.  Ambulating well.  Tolerating PO.  Reports passing flatus.  Medicated for pain as ordered.  Upon last round to administer Motrin, pt declined med stating no pain.  Encouraged  pt to take Motrin due pain finally relieved, but to assist in keeping pain away.  Pt continued to decline.  Encouraged to feed infant 8 or more times in 24 hours and observe for hunger cues.  Verbalized understandng.

## 2025-07-17 NOTE — PLAN OF CARE
Note copied from Infant's chart (MRN: [id number])     SOCIAL WORK DISCHARGE PLANNING ASSESSMENT     SW completed discharge planning assessment with pt's mother with assistance from  Darren [id number]. Pt's mother was easily engaged and education on the role of  was provided. Pt's mother reported all necessities for patient were obtained, including a car seat. Pt's mother reported she has minimal support from family and friends. Pt's father will provide transportation home following discharge. No needs for community resources were reported. Pt's mother was encouraged to call with any questions or concerns. Pt's mother verbalized understanding.      Legal Name: Sosa Toledo        :  2025  Address: [address] [place] [address] [address] [place] [address]  Parent's Phone Numbers: pt's mother Jak Toledo [phone] and pt's father Yadi Caldwell [phone]     Pediatrician: Dr. Elaine Nunez       Birth Hospital:[facility]           MARIVEL: 25     Birth Weight:   3.29 kg (7 lb 4.1 oz)                Birth Length: 49.5cm               Gestational Age: 39w5d           Apgars    Living status: Living  Apgar Component Scores:  1 min.:  5 min.:  10 min.:  15 min.:  20 min.:    Skin color:  1  1          Heart rate:  2  2          Reflex irritability:  2  2          Muscle tone:  2  2          Respiratory effort:  2  2          Total:  9  9          Apgars assigned by: GEORGE LAGUNA RN           25 [id number]   OB Discharge Planning Assessment   Assessment Type Discharge Planning Assessment   Source of Information family; utilized  (pt's mother with  Darren [id number])   Verified Demographic and Insurance Information Yes   Insurance Medicaid   Medicaid [facility]   Medicaid Insurance Primary   Spiritual Affiliation Congregational    Contact Status none needed   Father's Involvement Fully Involved   Is Father signing the birth certificate Yes    Father's Address [address] [address]   Family Involvement Minimal   Primary Contact Name and Number pt's mother Jak Guerra [phone] and pt's father Yadi Caldwell [phone]   Received Prenatal Care Yes   Transportation Anticipated family or friend will provide   Receive [facility] Benefits Already certified, will apply for new born    Arrangements Self;Family;Friends   Infant Feeding Plan breastfeeding;formula feeding   Does baby have crib or safe sleep space? Yes   Do you have a car seat? Yes   Has other essential care items? Clothing;Bottles;Diapers   Pediatrician Dr. Elaine Tiptonar   Resources/Education Provided Preparing for Your Baby's Discharge Home   DCFS No indications (Indicators for Report)   Discharge Plan A Home with family

## 2025-07-17 NOTE — NURSING
1545pm=  admitted to unit.  Resp even and unlabored.  POC reviewed.  Verbalized understanding.  Oriented to room, surroundings, and call light.  Safety maintained with call light in reach.

## 2025-07-18 VITALS
OXYGEN SATURATION: 97 % | RESPIRATION RATE: 18 BRPM | HEIGHT: 66 IN | SYSTOLIC BLOOD PRESSURE: 109 MMHG | HEART RATE: 66 BPM | BODY MASS INDEX: 24.87 KG/M2 | WEIGHT: 154.75 LBS | DIASTOLIC BLOOD PRESSURE: 73 MMHG | TEMPERATURE: 98 F

## 2025-07-18 PROBLEM — Z98.51 S/P TUBAL LIGATION: Status: ACTIVE | Noted: 2025-07-18

## 2025-07-18 LAB
ESTROGEN SERPL-MCNC: NORMAL PG/ML
INSULIN SERPL-ACNC: NORMAL U[IU]/ML
LAB AP DIAGNOSIS CATEGORY: NORMAL
LAB AP GROSS DESCRIPTION: NORMAL
LAB AP PERFORMING LOCATION(S): NORMAL
LAB AP REPORT FOOTNOTES: NORMAL

## 2025-07-18 PROCEDURE — 25000003 PHARM REV CODE 250: Performed by: OBSTETRICS & GYNECOLOGY

## 2025-07-18 RX ORDER — OXYCODONE AND ACETAMINOPHEN 5; 325 MG/1; MG/1
1 TABLET ORAL EVERY 4 HOURS PRN
Qty: 15 TABLET | Refills: 0 | Status: SHIPPED | OUTPATIENT
Start: 2025-07-18

## 2025-07-18 RX ORDER — IBUPROFEN 800 MG/1
800 TABLET, FILM COATED ORAL 3 TIMES DAILY
Qty: 60 TABLET | Refills: 1 | Status: SHIPPED | OUTPATIENT
Start: 2025-07-18

## 2025-07-18 RX ADMIN — PRENATAL VIT W/ FE FUMARATE-FA TAB 27-0.8 MG 1 TABLET: 27-0.8 TAB at 08:07

## 2025-07-18 RX ADMIN — IBUPROFEN 600 MG: 600 TABLET ORAL at 12:07

## 2025-07-18 RX ADMIN — OXYCODONE AND ACETAMINOPHEN 1 TABLET: 325; 10 TABLET ORAL at 09:07

## 2025-07-18 NOTE — PLAN OF CARE
Mom will continue to  breastfeed frequently & on cue at least 8+ times/24 hrs.  Will monitor for signs of deep latch & adequate fdg; I&O.  Will have baby's weight checked at ped's office in the next couple of days after d/c from hospital as recommended. Discussed available resources in Breastfeeding Guide. Instructed to call for any questions/needs. Verbalized understanding.            Mom will continue to pump/hand express at least 8+ times/24 hrs for  baby. Symphony pump at bs. Reviewed use/cleaning. Stressed importance of hand hygiene & keeping pump kit clean. Will collect and feed baby EBM as instructed. Will call for any needs.

## 2025-07-18 NOTE — H&P
HISTORY AND PHYSICAL                                                OBSTETRICS          Subjective:       Jak Guerra is a 30 y.o.  female with IUP at 39w5d weeks gestation who presents with complaints of painful contractions with subsequent triage evaluation consistent with labor. She was subsequently admitted. This IUP is complicated by unwanted fertility.    Review of Systems   Constitutional:  Negative for chills and fever.   Eyes:  Negative for visual disturbance.   Respiratory:  Negative for shortness of breath.    Cardiovascular:  Negative for chest pain.   Gastrointestinal:  Positive for abdominal pain. Negative for diarrhea, nausea and vomiting.   Genitourinary:  Negative for dysuria, hematuria, vaginal bleeding and vaginal discharge.   Integumentary:  Negative for rash.   Neurological:  Negative for headaches.   Psychiatric/Behavioral: Negative.         PMHx: History reviewed. No pertinent past medical history.    PSHx:   Past Surgical History:   Procedure Laterality Date    APPENDECTOMY      LAPAROSCOPIC APPENDECTOMY N/A 10/15/2023    Procedure: APPENDECTOMY, LAPAROSCOPIC;  Surgeon: Matt Manuel MD;  Location: [facility] OR;  Service: General;  Laterality: N/A;    POSTPARTUM LIGATION OF FALLOPIAN TUBE Bilateral 2025    Procedure: LIGATION, FALLOPIAN TUBE, POSTPARTUM;  Surgeon: Kendal Conteh MD;  Location: [facility] L&D;  Service: OB/GYN;  Laterality: Bilateral;       All: Review of patient's allergies indicates:  No Known Allergies    Meds: Prescriptions Prior to Admission[1]    SH: Social History[2]    FH: No family history on file.    OBHx:   OB History    Para Term  AB Living   4 4 4 0 0 3   SAB IAB Ectopic Multiple Live Births   0 0 0 0 4      # Outcome Date GA Lbr Lasha/2nd Weight Sex Type Anes PTL Lv   4 Term 25 39w5d 03:37 / 00:28 3.29 kg (7 lb 4.1 oz) F Vag-Spont EPI N NINA      Name: Girl Jak Guerra      Apgar1: 9  Apgar5: 9   3 Term  "24 41w1d  3.21 kg (7 lb 1.2 oz) F Vag-Spont Spinal, EPI N NINA      Name: Tad Guerra      Apgar1: 9  Apgar5: 9   2 Term 18 37w1d 07:29 / 02:10 2.572 kg (5 lb 10.7 oz) F Vag-Spont EPI N NINA      Complications: Cholestasis      Name: TAD VEGA      Apgar1: 8  Apgar5: 9   1 Term      Vag-Spont   ND       Objective:       /73 (BP Location: Right arm, Patient Position: Lying)   Pulse 66   Temp 97.5 °F (36.4 °C) (Oral)   Resp 18   Ht 5' 6" (1.676 m)   Wt 70.2 kg (154 lb 12.2 oz)   LMP 10/01/2024   SpO2 97%   Breastfeeding Yes   BMI 24.98 kg/m²     Vitals:    25 2028 25 0200 25 0800 25 0915   BP:  107/68 109/73    BP Location:   Right arm    Patient Position:   Lying    Pulse:  65 66    Resp:  18   Temp:  98.1 °F (36.7 °C) 97.5 °F (36.4 °C)    TempSrc:   Oral    SpO2:       Weight:       Height:           General:   alert, appears stated age and cooperative, no apparent distress   HENT:  normocephalic, atraumatic   Eyes:  extraocular movements and conjunctivae normal   Neck:  supple, range of motion normal, no thyromegaly   Lungs:   no respiratory distress   Heart:   regular rate   Abdomen:  soft, non-tender, non-distended but gravid, no rebound or guarding    Extremities negative edema, negative erythema   FHT: 140, moderate BTBV, +accels, -decels;  Cat 1 (reassuring)                 TOCO: Q 2-5 minutes   Presentations: cephalic by digital exam   Cervix:     Dilation: 6    Effacement: 75%    Station:  -2     Recent Growth Scan: EFW 30% AC 51% (36 wks    Lab Review  Blood Type A POS  GBBS: NEG       Assessment:       39w5d weeks gestation - LABOR    Active Hospital Problems    Diagnosis  POA    * (spontaneous vaginal delivery) [O80]  Not Applicable    S/P tubal ligation [Z98.51]  Not Applicable      Resolved Hospital Problems   No resolved problems to display.          Plan:   LABOR     Risks, benefits, alternatives and " possible complications have been discussed in detail with the patient.   - Consents signed and to chart  - Admit to Labor and Delivery unit  - Expectant management   - Epidural per Anesthesia  - Draw CBC, T&S  - Notify Staff  - Recheck in 2-4 hrs or PRN  - EFW - As above  - Post-Partum Hemorrhage risk - low    Kendal Conteh M.D.  OB/GYN  [facility]           [1]   No medications prior to admission.   [2]   Social History  Socioeconomic History    Marital status: Single   Tobacco Use    Smoking status: Never     Passive exposure: Never    Smokeless tobacco: Never   Substance and Sexual Activity    Alcohol use: No    Drug use: No    Sexual activity: Yes     Partners: Male   Social History Narrative    ** Merged History Encounter **          Social Drivers of Health     Financial Resource Strain: Low Risk  (7/16/2025)    Overall Financial Resource Strain (CARDIA)     Difficulty of Paying Living Expenses: Not very hard   Food Insecurity: No Food Insecurity (7/16/2025)    Hunger Vital Sign     Worried About Running Out of Food in the Last Year: Never true     Ran Out of Food in the Last Year: Never true   Transportation Needs: No Transportation Needs (7/16/2025)    PRAPARE - Transportation     Lack of Transportation (Medical): No     Lack of Transportation (Non-Medical): No   Stress: No Stress Concern Present (7/16/2025)    [employer] - Occupational Stress Questionnaire     Feeling of Stress : Not at all   Housing Stability: Low Risk  (7/16/2025)    Housing Stability Vital Sign     Unable to Pay for Housing in the Last Year: No     Homeless in the Last Year: No

## 2025-07-18 NOTE — LACTATION NOTE
"Rounded on couplet. Mom reports baby breastfeeds well "more or less". When prompted further, mom stated that baby latches well on left breast but has difficulty latching onto right. Currently wearing nipple shells. Reports tenderness to andrew. Nipples. Observed left nipple to be reddened. Mom was offered gel pad for left nipple, which she accepted. Demonstrated to mom how to hand express her own colostrum and apply it to nipple, then gel pad was placed onto nipple and into bra. Encouraged mom to wear gel pad when she is not feeding, alternating with nipple shell as needed.  Reviewed importance of deep latch to avoid nipple pain/damage. Encouraged mom to call this RN prior to next feeding for latch assistance. Mom currently giving baby lots of formula. Encouraged her to put baby to breast prior to giving any formula to establish healthy milk supply and provide baby with health benefits of colostrum.   Discharge teaching done. Mom given lact. Center phone number as well as community resources via handout.   Mom verbalized understanding.    [facility] - Mother & Baby  Lactation Note - Mom    SUMMARY     Maternal Assessment    Breast Shape: Bilateral:, round  Breast Density: Bilateral:, filling  Areola: Bilateral:, elastic  Nipples: Bilateral:, inverted  Left Nipple Symptoms: tender, redness  Right Nipple Symptoms:  (denies pain)      LATCH Score         Breasts WDL    Breast WDL: WDL except, nipple symptoms  Left Nipple Symptoms: tender, redness  Right Nipple Symptoms:  (denies pain)    Maternal Infant Feeding    Maternal Preparation: breast care, hand hygiene  Maternal Emotional State: relaxed  Infant Positioning: cradle  Signs of Milk Transfer: other (see comments) (few non-nutritive sucks)  Pain with Feeding: no  Comfort Measures Before/During Feeding: infant position adjusted, latch adjusted, maternal position adjusted  Comfort Measures Following Feeding: breast shell(s) used, expressed milk applied  Nipple Shape After " Feeding, Left: no latch achieved  Nipple Shape After Feeding, Right: slightly everted, round  Latch Assistance:  (enc to call prior to next feeding for latch assistance/PRN)    Lactation Referrals    Community Referrals: pediatric care provider, support group  Outpatient Lactation Program Lactation Follow-up Date/Time: call lact ctr prn  Pediatric Care Provider Lactation Follow-up Date/Time: follow up with peds within 1-2 days for wt check  Support Group Lactation Follow-up Date/Time: community resources given in handout    Lactation Interventions    Breast Care: Breastfeeding: breast milk to nipples, Hydrogel dressing applied, open to air, lanolin to nipples  Breastfeeding Assistance: feeding on demand promoted, hand expression verified, feeding cue recognition promoted, support offered, nipple shell utilized  Breast Care: Breastfeeding: breast milk to nipples, Hydrogel dressing applied, open to air, lanolin to nipples  Breastfeeding Assistance: feeding on demand promoted, hand expression verified, feeding cue recognition promoted, support offered, nipple shell utilized  Breastfeeding Support: diary/feeding log utilized, encouragement provided, infant-mother separation minimized, maternal hydration promoted, lactation counseling provided, maternal nutrition promoted, maternal rest encouraged       Breastfeeding Session    Breast Pumping Interventions: post-feed pumping encouraged  Infant Positioning: cradle  Signs of Milk Transfer: other (see comments) (few non-nutritive sucks)    Maternal Information

## 2025-07-18 NOTE — DISCHARGE SUMMARY
Delivery Discharge Summary  Obstetrics      Primary OB Clinician: Kendal Conteh MD      Admission date: 2025  Discharge date: 2025    Disposition: To home, self care    Discharge Diagnosis List:    Problem List[1]    Procedure:     Hospital Course:  Jak Guerra is a 30 y.o. now , PPD #2 s/p  and POD # 2 s/p PP tubal ligation who was admitted on 2025 at 39w3d for active labor. Patient was subsequently admitted to labor and delivery unit with signed consents. Labor course was uncomplicated and resulted in  without complications. Please see delivery note for further details. Her postpartum course was uncomplicated. She also underwent an uncomplicated postpartum tubal ligation. On discharge day, patient's pain is controlled with oral pain medications. Pt is tolerating ambulation without SOB or CP, and regular diet without N/V. Reports lochia is mild. Denies any HA, vision changes, F/C, LE swelling. Denies any breast pain/soreness.    Pt in stable condition and ready for discharge. She has been instructed to start and/or continue medications and follow up with her obstetrics provider as listed below.    Pertinent studies:  CBC  Recent Labs   Lab 25  0233 25  0707   WBC 6.92 8.19   HGB 10.8* 10.8*   HCT 31.8* 33.4*   MCV 87 89    148*          Immunization History   Administered Date(s) Administered    Influenza - Quadrivalent - PF *Preferred* (6 months and older) 10/19/2018, 10/16/2023    Tdap 10/19/2018, 2024, 2025        Delivery:    Episiotomy: None   Lacerations: None   Repair suture: None   Repair # of packets: 0   Blood loss (ml):       Birth information:  YOB: 2025   Time of birth: 5:53 AM   Sex: female   Delivery type: Vaginal, Spontaneous   Gestational Age: 39w5d     Measurements    Weight: 3290 g  Weight (lbs): 7 lb 4.1 oz  Length:          Delivery Clinician: Delivery Providers    Delivering clinician: Matthew  Marianne CRAMER MD   Provider Role    Marianne Castro MD Physician    Victor M Ignacio,  Surgical Tech    Elise Perry RN Registered Nurse    George Goldsmith RN Registered Nurse             Additional  information:  Forceps:    Vacuum:    Breech:    Observed anomalies      Living?:     Apgars    Living status: Living  Apgar Component Scores:  1 min.:  5 min.:  10 min.:  15 min.:  20 min.:    Skin color:  1  1       Heart rate:  2  2       Reflex irritability:  2  2       Muscle tone:  2  2       Respiratory effort:  2  2       Total:  9  9       Apgars assigned by: GEORGE GOLDSMITH RN         Placenta: Delivered:       appearance    Patient Instructions:   Current Discharge Medication List        START taking these medications    Details   oxyCODONE-acetaminophen (PERCOCET) 5-325 mg per tablet Take 1 tablet by mouth every 4 (four) hours as needed for Pain.  Qty: 15 tablet, Refills: 0    Comments: n/a   Associated Diagnoses:  (spontaneous vaginal delivery)           CONTINUE these medications which have CHANGED    Details   ibuprofen (ADVIL,MOTRIN) 800 MG tablet Take 1 tablet (800 mg total) by mouth 3 (three) times daily.  Qty: 60 tablet, Refills: 1           STOP taking these medications       famotidine (PEPCID) 20 MG tablet Comments:   Reason for Stopping:         ondansetron (ZOFRAN-ODT) 4 MG TbDL Comments:   Reason for Stopping:               Discharge Procedure Orders   Pelvic Rest   Order Comments: Pelvic Rest - Nothing in the Vagina for 6 weeks.     Notify your health care provider if you experience any of the following:   Order Comments: Vaginal Bleeding greater than a pad per hour.     Notify your health care provider if you experience any of the following:  increased confusion or weakness     Notify your health care provider if you experience any of the following:  persistent dizziness, light-headedness, or visual disturbances     Notify your health care provider if you experience any of the following:   worsening rash     Notify your health care provider if you experience any of the following:  severe persistent headache     Notify your health care provider if you experience any of the following:  difficulty breathing or increased cough     Notify your health care provider if you experience any of the following:  redness, tenderness, or signs of infection (pain, swelling, redness, odor or green/yellow discharge around incision site)     Notify your health care provider if you experience any of the following:  severe uncontrolled pain     Notify your health care provider if you experience any of the following:  persistent nausea and vomiting or diarrhea     Notify your health care provider if you experience any of the following:  temperature >100.4     Activity as tolerated            Kendal Conteh M.D.  OB/GYN  [facility]           [1]   Patient Active Problem List  Diagnosis    Acute appendicitis with localized peritonitis, without perforation, abscess, or gangrene     (spontaneous vaginal delivery)    Encounter for supervision of normal pregnancy in third trimester    Unwanted fertility    S/P tubal ligation

## 2025-07-18 NOTE — PLAN OF CARE
AAOx4, respirations clear and unlabored, bowel sounds present, and +2 pulses bilaterally. Tolerating regular diet, ambulating freely without dizziness, and voiding spontaneously. POC reviewed with pt and understanding expressed. FF spontaneously.   Yoruba interpretors used: Sonya #[id number] and Fidencio  #[id number]  Vss and nad noted. Safety precautions maintained. Pt stable and ready for discharge; d/c teaching given to pt and family. Pt left via wheelchair with infant in arms.    no

## 2025-07-18 NOTE — PROGRESS NOTES
POSTPARTUM PROGRESS NOTE     Jak Guerra is a 30 y.o. female PPD #2 status post Spontaneous vaginal delivery at 39w5d as well as POD # 2 s/p postpartum tubal ligation. This pregnancy is complicated by unwanted fertility. Patient is doing well this morning. She denies nausea, vomiting, fever or chills. Patient reports mild abdominal pain that is adequately relieved by oral pain medications. Lochia is mild to moderate  and decreasing. Patient is voiding without difficulty and ambulating with no difficulty. She has passed flatus, and has not had BM. Patient does plan to breast feed. S/P PP Tubal for contraception.     Objective:       Temp:  [98.1 °F (36.7 °C)-98.5 °F (36.9 °C)] 98.1 °F (36.7 °C)  Pulse:  [64-70] 65  Resp:  [18] 18  SpO2:  [97 %-99 %] 97 %  BP: (104-109)/(67-70) 107/68    General:   alert, appears stated age, and cooperative   Lungs:   Non-labored breathing   Heart:   regular rate and rhythm   Abdomen:  soft, non-tender; bowel sounds normal; no masses,  no organomegaly   Uterus:  firm located at the umblicus.    Incision: Bandage in place, clean, dry and intact   Extremities: no pedal edema noted     Lab Review  No results found for this or any previous visit (from the past 4 hours).    I/O  No intake or output data in the 24 hours ending 25 0721       Assessment:     Problem List[1]     Plan:   1. Postpartum care:  - Patient doing well. Continue routine management and advances.  - Continue PO pain meds. Pain well controlled.  - Heme: H/H - 10.8/31.8 > 10.8/33.4   - Encourage ambulation  - Contraception - S/P PP BTL  - Lactation consultant following      Dispo: As patient meets milestones, will plan to discharge today.    Kendal Conteh           [1]   Patient Active Problem List  Diagnosis    Acute appendicitis with localized peritonitis, without perforation, abscess, or gangrene     (spontaneous vaginal delivery)    Encounter for supervision of normal pregnancy in third  trimester    Unwanted fertility

## 2025-08-18 ENCOUNTER — POSTPARTUM VISIT (OUTPATIENT)
Dept: OBSTETRICS AND GYNECOLOGY | Facility: CLINIC | Age: 30
End: 2025-08-18
Payer: MEDICAID

## 2025-08-18 VITALS
SYSTOLIC BLOOD PRESSURE: 102 MMHG | DIASTOLIC BLOOD PRESSURE: 52 MMHG | BODY MASS INDEX: 22.63 KG/M2 | WEIGHT: 140.19 LBS | HEART RATE: 65 BPM

## 2025-08-18 PROCEDURE — 99213 OFFICE O/P EST LOW 20 MIN: CPT | Mod: PBBFAC,PO | Performed by: STUDENT IN AN ORGANIZED HEALTH CARE EDUCATION/TRAINING PROGRAM

## 2025-08-18 PROCEDURE — 99999 PR PBB SHADOW E&M-EST. PATIENT-LVL III: CPT | Mod: PBBFAC,,, | Performed by: STUDENT IN AN ORGANIZED HEALTH CARE EDUCATION/TRAINING PROGRAM

## (undated) DEVICE — ELECTRODE REM PLYHSV RETURN 9